# Patient Record
Sex: FEMALE | Race: OTHER | HISPANIC OR LATINO | Employment: FULL TIME | URBAN - METROPOLITAN AREA
[De-identification: names, ages, dates, MRNs, and addresses within clinical notes are randomized per-mention and may not be internally consistent; named-entity substitution may affect disease eponyms.]

---

## 2019-08-14 ENCOUNTER — TELEPHONE (OUTPATIENT)
Dept: PAIN MEDICINE | Facility: CLINIC | Age: 59
End: 2019-08-14

## 2019-08-14 ENCOUNTER — TELEPHONE (OUTPATIENT)
Dept: PHYSICAL THERAPY | Facility: OTHER | Age: 59
End: 2019-08-14

## 2019-08-14 NOTE — TELEPHONE ENCOUNTER
Message left for Pt to call us back  Our ph # and hours of business provided  Waiting for return call from Pt  This nurse did return Pt's phone call

## 2019-08-14 NOTE — TELEPHONE ENCOUNTER
S/w patient, referred by a co-worker who has the "same problem"  Pt has been seeing a chiropractor for sciatica since Jan 2018, hasn't gotten any better  Agent explained [facility], pt agreed to be transferred

## 2019-10-18 ENCOUNTER — NURSE TRIAGE (OUTPATIENT)
Dept: PHYSICAL THERAPY | Facility: OTHER | Age: 59
End: 2019-10-18

## 2019-10-18 NOTE — TELEPHONE ENCOUNTER
This nurse did review in detail the comp spine program and what we can provide for the pt for their back pain  Pt is refusing to see PM&R, and is requesting to see a physician  This nurse again reviewed the roll of PM&R with Pt, and is still requesting to see a physician  Pt stated, "I just want a doctor to tell me why I have all of this pain in back, and feet, and arms "    Pt is currently doing aqua therapy, acupunture, seeing a Chiropractor, Pain management,and Rheumatologist       This nurse did offer Pt to see our physical therapy and Pt refused  Pt is going to follow up with PCP for further direction  Pt was given our phone # and our hours of business, if she should change her mind  No referral noted

## 2019-11-01 ENCOUNTER — OFFICE VISIT (OUTPATIENT)
Dept: OBGYN CLINIC | Facility: MEDICAL CENTER | Age: 59
End: 2019-11-01
Payer: COMMERCIAL

## 2019-11-01 VITALS
HEIGHT: 64 IN | WEIGHT: 160 LBS | DIASTOLIC BLOOD PRESSURE: 93 MMHG | BODY MASS INDEX: 27.31 KG/M2 | HEART RATE: 93 BPM | RESPIRATION RATE: 18 BRPM | SYSTOLIC BLOOD PRESSURE: 141 MMHG

## 2019-11-01 DIAGNOSIS — M54.50 CHRONIC BILATERAL LOW BACK PAIN WITHOUT SCIATICA: ICD-10-CM

## 2019-11-01 DIAGNOSIS — R20.2 NUMBNESS AND TINGLING OF BOTH FEET: ICD-10-CM

## 2019-11-01 DIAGNOSIS — M50.30 DDD (DEGENERATIVE DISC DISEASE), CERVICAL: ICD-10-CM

## 2019-11-01 DIAGNOSIS — M54.2 CHRONIC NECK PAIN: Primary | ICD-10-CM

## 2019-11-01 DIAGNOSIS — G89.29 CHRONIC NECK PAIN: Primary | ICD-10-CM

## 2019-11-01 DIAGNOSIS — M47.812 CERVICAL SPONDYLOSIS: ICD-10-CM

## 2019-11-01 DIAGNOSIS — G89.29 CHRONIC BILATERAL LOW BACK PAIN WITHOUT SCIATICA: ICD-10-CM

## 2019-11-01 DIAGNOSIS — R20.0 NUMBNESS AND TINGLING OF BOTH FEET: ICD-10-CM

## 2019-11-01 PROCEDURE — 99204 OFFICE O/P NEW MOD 45 MIN: CPT | Performed by: EMERGENCY MEDICINE

## 2019-11-01 RX ORDER — MELOXICAM 15 MG/1
15 TABLET ORAL DAILY
COMMUNITY

## 2019-11-01 NOTE — PROGRESS NOTES
om    Assessment/Plan:    Diagnoses and all orders for this visit:    Chronic neck pain  -     Ambulatory referral to Pain Management; Future    DDD (degenerative disc disease), cervical  -     Ambulatory referral to Pain Management; Future    Cervical spondylosis  -     Ambulatory referral to Pain Management; Future    Chronic bilateral low back pain without sciatica  -     Ambulatory referral to Pain Management; Future    Numbness and tingling of both feet  -     Ambulatory referral to Pain Management; Future    Other orders  -     Misc Natural Products (TART CHERRY ADVANCED PO); Take by mouth  -     meloxicam (MOBIC) 15 mg tablet; Take 15 mg by mouth daily  -     calcium-vitamin D (OSCAL) 250-125 MG-UNIT per tablet; Take 1 tablet by mouth daily  -     conjugated estrogens (PREMARIN) vaginal cream; Insert into the vagina daily    Referred to Pain management forf second opinion  Records scanned into system, reviewed previous results and images of testing including MRI, Xrays and EMG  Patient has already undergone therapy, ESIs and medications  Return if symptoms worsen or fail to improve  Chief Complaint:     Chief Complaint   Patient presents with    Spine - Pain       Subjective:   Patient ID: Roger Hebert is a 61 y o  female  NP presents for second opinion regarding pain starting this summer in July  She has upper back midline pain, constant, as well as lower midline pain and pain over b/l SIJs  B/L n/t feet  Treated at outside facility by pain management, prescribed gabapentin however she experienced right sided body n/t  She has undergone oral steroid pills, an injection of the lower back, and MRI was obtained  She was then seen by another pain management physician, underwent ESIs, EMG was ordered due to n/t legs, referred to Rheumatologist whom she saw today  Was evaluated by foot and ankle specialist, dx with tarsal tunnel and provided b/l steroid injection with no benefit      She  Has been working with chiropractor, accupunture and water therapy      Review of Systems    The following portions of the patient's chart were reviewed and updated as appropriate: Allergy:    Allergies   Allergen Reactions    Gabapentin        History reviewed  No pertinent past medical history  Past Surgical History:   Procedure Laterality Date    CARPAL TUNNEL RELEASE      HYSTERECTOMY      PLANTAR FASCIECTOMY         Social History     Socioeconomic History    Marital status: Single     Spouse name: Not on file    Number of children: Not on file    Years of education: Not on file    Highest education level: Not on file   Occupational History    Not on file   Social Needs    Financial resource strain: Not on file    Food insecurity:     Worry: Not on file     Inability: Not on file    Transportation needs:     Medical: Not on file     Non-medical: Not on file   Tobacco Use    Smoking status: Never Smoker    Smokeless tobacco: Never Used   Substance and Sexual Activity    Alcohol use: Not Currently    Drug use: Not on file    Sexual activity: Not on file   Lifestyle    Physical activity:     Days per week: Not on file     Minutes per session: Not on file    Stress: Not on file   Relationships    Social connections:     Talks on phone: Not on file     Gets together: Not on file     Attends [employer] service: Not on file     Active member of club or organization: Not on file     Attends meetings of clubs or organizations: Not on file     Relationship status: Not on file    Intimate partner violence:     Fear of current or ex partner: Not on file     Emotionally abused: Not on file     Physically abused: Not on file     Forced sexual activity: Not on file   Other Topics Concern    Not on file   Social History Narrative    Not on file       History reviewed  No pertinent family history      Medications:    Current Outpatient Medications:     calcium-vitamin D (OSCAL) 250-125 MG-UNIT per tablet, Take 1 tablet by mouth daily, Disp: , Rfl:     conjugated estrogens (PREMARIN) vaginal cream, Insert into the vagina daily, Disp: , Rfl:     meloxicam (MOBIC) 15 mg tablet, Take 15 mg by mouth daily, Disp: , Rfl:     Misc Natural Products (TART CHERRY ADVANCED PO), Take by mouth, Disp: , Rfl:     There is no problem list on file for this patient  Objective:  /93 (BP Location: Left arm, Patient Position: Standing, Cuff Size: Standard)   Pulse 93   Resp 18   Ht 5' 4" (1 626 m)   Wt 72 6 kg (160 lb)   BMI 27 46 kg/m²     Ortho Exam    Physical Exam   Constitutional: She is oriented to person, place, and time  She appears well-developed and well-nourished  HENT:   Head: Normocephalic and atraumatic  Eyes: Conjunctivae are normal    Neck: Normal range of motion  Neck supple  Cardiovascular: Normal rate and intact distal pulses  Pulmonary/Chest: Effort normal  No respiratory distress  Neurological: She is alert and oriented to person, place, and time  Skin: Skin is warm and dry  Psychiatric: She has a normal mood and affect  Her behavior is normal    Vitals reviewed  Neurologic Exam     Mental Status   Oriented to person, place, and time  Procedures    I have personally reviewed the written report of the pertinent studies  Outside EMG performed at [facility]: Results scanned into chart, Mild tarsal tunnel b/l  Interface, Rad Results In - 09/05/2019  3:22 PM EDT  History: Lumbar pain with radiation down left leg    Technique: MRI of the lumbar spine was performed without intravenous contrast  utilizing sagittal T1, sagittal T2, sagittal STIR, and axial T2 sequences  Comparison: None    Findings: There is normal lumbar lordosis  Vertebral body heights and alignment are  maintained  Normal marrow signal is preserved  The distal thoracic cord and  conus medullaris are unremarkable; the conus terminates at the L2 level  Paraspinal soft tissues are unremarkable      Disc desiccation without significant height loss from L3-L4 to L5-S1  L1-L2: No canal or foraminal stenosis  L2-L3: No canal or foraminal stenosis  L3-L4: Trace disc bulge  Mild facet arthropathy  No canal or left foraminal  stenosis  Mild right foraminal stenosis  L4-L5: Mild facet arthropathy  No canal or foraminal stenosis  L5-S1: Disc bulge with small central annular fissure  Mild facet arthropathy  No  canal or foraminal stenosis  T2 hyperintense left renal lesion statistically likely reflects an incidental  cyst but is incompletely characterized on this study  IMPRESSION:   Mild lower lumbar degenerative changes without significant stenosis

## 2020-07-19 ENCOUNTER — OFFICE VISIT (OUTPATIENT)
Dept: URGENT CARE | Age: 60
End: 2020-07-19
Payer: COMMERCIAL

## 2020-07-19 VITALS
RESPIRATION RATE: 16 BRPM | HEIGHT: 64 IN | OXYGEN SATURATION: 99 % | DIASTOLIC BLOOD PRESSURE: 82 MMHG | WEIGHT: 160 LBS | TEMPERATURE: 99 F | HEART RATE: 79 BPM | BODY MASS INDEX: 27.31 KG/M2 | SYSTOLIC BLOOD PRESSURE: 132 MMHG

## 2020-07-19 DIAGNOSIS — N30.00 ACUTE CYSTITIS WITHOUT HEMATURIA: Primary | ICD-10-CM

## 2020-07-19 LAB
SL AMB  POCT GLUCOSE, UA: NEGATIVE
SL AMB LEUKOCYTE ESTERASE,UA: ABNORMAL
SL AMB POCT BILIRUBIN,UA: NEGATIVE
SL AMB POCT BLOOD,UA: ABNORMAL
SL AMB POCT CLARITY,UA: CLEAR
SL AMB POCT COLOR,UA: YELLOW
SL AMB POCT KETONES,UA: NEGATIVE
SL AMB POCT NITRITE,UA: NEGATIVE
SL AMB POCT PH,UA: 6
SL AMB POCT SPECIFIC GRAVITY,UA: 1.01
SL AMB POCT URINE PROTEIN: NEGATIVE
SL AMB POCT UROBILINOGEN: 0.2

## 2020-07-19 PROCEDURE — 99213 OFFICE O/P EST LOW 20 MIN: CPT | Performed by: PHYSICIAN ASSISTANT

## 2020-07-19 PROCEDURE — 87086 URINE CULTURE/COLONY COUNT: CPT | Performed by: PHYSICIAN ASSISTANT

## 2020-07-19 PROCEDURE — 81002 URINALYSIS NONAUTO W/O SCOPE: CPT | Performed by: PHYSICIAN ASSISTANT

## 2020-07-19 RX ORDER — CEPHALEXIN 500 MG/1
500 CAPSULE ORAL EVERY 8 HOURS SCHEDULED
Qty: 21 CAPSULE | Refills: 0 | Status: SHIPPED | COMMUNITY
Start: 2020-07-19 | End: 2020-07-26

## 2020-07-19 RX ORDER — ASPIRIN 81 MG/1
81 TABLET, CHEWABLE ORAL DAILY
COMMUNITY

## 2020-07-19 NOTE — PROGRESS NOTES
[address] [facility]        NAME: Jamie Dial is a 61 y o  female  : 1960    MRN: [id number]  DATE: 2020  TIME: 8:50 AM    Assessment and Plan   Acute cystitis without hematuria [N30 00]  1  Acute cystitis without hematuria  POCT urine dip    Urine culture    cephalexin (KEFLEX) 500 mg capsule         Patient Instructions       Continue to monitor symptoms  If new or worsening symptoms develop, go immediately to Er  Drink plenty of fluids  Follow up with Family Doctor this week  Chief Complaint     Chief Complaint   Patient presents with    Possible UTI     Patient reports UTI sx (dysuria, frequency, flank pain) for approx 72 hours  History of Present Illness       Difficulty Urinating    This is a new problem  Episode onset: 3 days ago  The problem occurs every urination  The problem has been gradually worsening  The quality of the pain is described as burning  The pain is mild  There has been no fever  There is a history of pyelonephritis  Associated symptoms include flank pain, frequency and urgency  Pertinent negatives include no chills, hesitancy, nausea, possible pregnancy or vomiting  She has tried increased fluids for the symptoms  The treatment provided no relief  Review of Systems   Review of Systems   Constitutional: Negative  Negative for chills, fatigue and fever  HENT: Negative  Eyes: Negative  Respiratory: Negative  Cardiovascular: Negative  Gastrointestinal: Negative for abdominal pain, constipation, diarrhea, nausea and vomiting  Endocrine: Negative  Genitourinary: Positive for dysuria, flank pain, frequency and urgency  Negative for hesitancy, pelvic pain, vaginal bleeding, vaginal discharge and vaginal pain  Musculoskeletal: Negative for back pain, gait problem, neck pain and neck stiffness  Skin: Negative  Negative for pallor and rash  Allergic/Immunologic: Negative  Neurological: Negative  Negative for weakness and numbness  Hematological: Negative  Psychiatric/Behavioral: Negative  Current Medications       Current Outpatient Medications:     calcium-vitamin D (OSCAL) 250-125 MG-UNIT per tablet, Take 1 tablet by mouth daily, Disp: , Rfl:     conjugated estrogens (PREMARIN) vaginal cream, Insert into the vagina daily, Disp: , Rfl:     Misc Natural Products (TART CHERRY ADVANCED PO), Take by mouth, Disp: , Rfl:     aspirin (Aspirin 81) 81 mg chewable tablet, Chew 81 mg daily, Disp: , Rfl:     cephalexin (KEFLEX) 500 mg capsule, Take 1 capsule (500 mg total) by mouth every 8 (eight) hours for 7 days, Disp: 21 capsule, Rfl: 0    meloxicam (MOBIC) 15 mg tablet, Take 15 mg by mouth daily, Disp: , Rfl:     Current Allergies     Allergies as of 07/19/2020 - Reviewed 07/19/2020   Allergen Reaction Noted    Gabapentin  11/01/2019            The following portions of the patient's history were reviewed and updated as appropriate: allergies, current medications, past family history, past medical history, past social history, past surgical history and problem list      Past Medical History:   Diagnosis Date    Back pain        Past Surgical History:   Procedure Laterality Date    CARPAL TUNNEL RELEASE      HYSTERECTOMY      PLANTAR FASCIECTOMY         History reviewed  No pertinent family history  Medications have been verified  Objective   /82   Pulse 79   Temp 99 °F (37 2 °C) (Tympanic)   Resp 16   Ht 5' 4" (1 626 m)   Wt 72 6 kg (160 lb)   SpO2 99%   BMI 27 46 kg/m²        Physical Exam     Physical Exam   Constitutional: She appears well-developed and well-nourished  No distress  HENT:   Head: Normocephalic and atraumatic  Cardiovascular: Normal rate, regular rhythm, normal heart sounds and intact distal pulses  Pulmonary/Chest: Effort normal and breath sounds normal  No respiratory distress  She has no wheezes  She has no rales  Abdominal: Soft   Bowel sounds are normal  She exhibits no distension  There is no tenderness  There is no guarding  No CVA tenderness   Skin: Skin is warm  Capillary refill takes less than 2 seconds  No rash noted  She is not diaphoretic  No pallor  Nursing note and vitals reviewed

## 2020-07-19 NOTE — PATIENT INSTRUCTIONS
Continue to monitor symptoms  If new or worsening symptoms develop, go immediately to Er  Drink plenty of fluids  Follow up with Family Doctor this week  Urinary Tract Infection in Women   WHAT YOU NEED TO KNOW:   A urinary tract infection (UTI) is caused by bacteria that get inside your urinary tract  Most bacteria that enter your urinary tract come out when you urinate  If the bacteria stay in your urinary tract, you may get an infection  Your urinary tract includes your kidneys, ureters, bladder, and urethra  Urine is made in your kidneys, and it flows from the ureters to the bladder  Urine leaves the bladder through the urethra  A UTI is more common in your lower urinary tract, which includes your bladder and urethra  DISCHARGE INSTRUCTIONS:   Return to the emergency department if:   · You are urinating very little or not at all  · You have a high fever with shaking chills  · You have side or back pain that gets worse  Contact your healthcare provider if:   · You have a fever  · You do not feel better after 2 days of taking antibiotics  · You are vomiting  · You have questions or concerns about your condition or care  Medicines:   · Antibiotics  help fight a bacterial infection  · Medicines  may be given to decrease pain and burning when you urinate  They will also help decrease the feeling that you need to urinate often  These medicines will make your urine orange or red  · Take your medicine as directed  Contact your healthcare provider if you think your medicine is not helping or if you have side effects  Tell him or her if you are allergic to any medicine  Keep a list of the medicines, vitamins, and herbs you take  Include the amounts, and when and why you take them  Bring the list or the pill bottles to follow-up visits  Carry your medicine list with you in case of an emergency    Follow up with your healthcare provider as directed:  Write down your questions so you remember to ask them during your visits  Prevent another UTI:   · Empty your bladder often  Urinate and empty your bladder as soon as you feel the need  Do not hold your urine for long periods of time  · Wipe from front to back after you urinate or have a bowel movement  This will help prevent germs from getting into your urinary tract through your urethra  · Drink liquids as directed  Ask how much liquid to drink each day and which liquids are best for you  You may need to drink more liquids than usual to help flush out the bacteria  Do not drink alcohol, caffeine, or citrus juices  These can irritate your bladder and increase your symptoms  Your healthcare provider may recommend cranberry juice to help prevent a UTI  · Urinate after you have sex  This can help flush out bacteria passed during sex  · Do not douche or use feminine deodorants  These can change the chemical balance in your vagina  · Change sanitary pads or tampons often  This will help prevent germs from getting into your urinary tract  · Do pelvic muscle exercises often  Pelvic muscle exercises may help you start and stop urinating  Strong pelvic muscles may help you empty your bladder easier  Squeeze these muscles tightly for 5 seconds like you are trying to hold back urine  Then relax for 5 seconds  Gradually work up to squeezing for 10 seconds  Do 3 sets of 15 repetitions a day, or as directed  © 2017 [employer] Information is for End User's use only and may not be sold, redistributed or otherwise used for commercial purposes  All illustrations and images included in CareNotes® are the copyrighted property of A D [employer] M [employer]  or Lance Iverson  The above information is an  only  It is not intended as medical advice for individual conditions or treatments   Talk to your doctor, nurse or pharmacist before following any medical regimen to see if it is safe and effective for you     Cephalexin (By mouth)   Cephalexin ([id number])  Treats infections  This medicine is a cephalosporin antibiotic  Brand Name(s): Daxbia, Keflex   There may be other brand names for this medicine  When This Medicine Should Not Be Used: This medicine is not right for everyone  Do not use this medicine if you had an allergic reaction to cephalexin or another cephalosporin medicine  How to Use This Medicine:   Capsule, Tablet, Tablet for Suspension, Liquid  · Your doctor will tell you how much medicine to use  Do not use more than directed  · Read and follow the patient instructions that come with this medicine  Talk to your doctor or pharmacist if you have any questions  · You may take your medicine with food or milk to avoid stomach upset  · Oral liquid: Shake well just before use  Measure the oral liquid medicine with a marked measuring spoon, oral syringe, or medicine cup  · Take all of the medicine in your prescription to clear up your infection, even if you feel better after the first few doses  · Missed dose: Take a dose as soon as you remember  If it is almost time for your next dose, wait until then and take a regular dose  Do not take extra medicine to make up for a missed dose  · Capsule or tablet: Store at room temperature away from heat, moisture, and direct light  · Oral liquid: Store in the refrigerator for 14 days  After 14 days, throw away any unused medicine  Do not freeze  Drugs and Foods to Avoid:   Ask your doctor or pharmacist before using any other medicine, including over-the-counter medicines, vitamins, and herbal products  · Some medicines and foods can affect how cephalexin works  Tell your doctor if you are also using  ¨ Metformin  ¨ Probenecid  Warnings While Using This Medicine:   · Tell your doctor if you are pregnant or breastfeeding, or if you have kidney disease, liver disease, or a history of digestive problems, such as colitis   Tell your doctor if you are allergic to penicillin  · This medicine can cause diarrhea  Call your doctor if the diarrhea becomes severe, does not stop, or is bloody  Do not take any medicine to stop diarrhea until you have talked to your doctor  Diarrhea can occur 2 months or more after you stop taking this medicine  · Tell any doctor or dentist who treats you that you are using this medicine  This medicine may affect certain medical test results  · Call your doctor if your symptoms do not improve or if they get worse  · Keep all medicine out of the reach of children  Never share your medicine with anyone  Possible Side Effects While Using This Medicine:   Call your doctor right away if you notice any of these side effects:  · Allergic reaction: Itching or hives, swelling in your face or hands, swelling or tingling in your mouth or throat, chest tightness, trouble breathing  · Blistering, peeling, red skin rash  · Severe diarrhea, especially if bloody or ongoing  · Severe stomach pain, vomiting  If you notice these less serious side effects, talk with your doctor:   · Mild diarrhea or nausea  If you notice other side effects that you think are caused by this medicine, tell your doctor  Call your doctor for medical advice about side effects  You may report side effects to FDA at 8-776-FDA-0378  © 2017 [employer] Information is for End User's use only and may not be sold, redistributed or otherwise used for commercial purposes  The above information is an  only  It is not intended as medical advice for individual conditions or treatments  Talk to your doctor, nurse or pharmacist before following any medical regimen to see if it is safe and effective for you

## 2020-07-20 LAB — BACTERIA UR CULT: ABNORMAL

## 2022-11-01 ENCOUNTER — OFFICE VISIT (OUTPATIENT)
Dept: FAMILY MEDICINE CLINIC | Facility: CLINIC | Age: 62
End: 2022-11-01

## 2022-11-01 VITALS
DIASTOLIC BLOOD PRESSURE: 80 MMHG | HEIGHT: 62 IN | BODY MASS INDEX: 26.06 KG/M2 | HEART RATE: 68 BPM | WEIGHT: 141.6 LBS | OXYGEN SATURATION: 99 % | SYSTOLIC BLOOD PRESSURE: 132 MMHG | RESPIRATION RATE: 16 BRPM | TEMPERATURE: 97.9 F

## 2022-11-01 DIAGNOSIS — G89.29 CHRONIC LEFT-SIDED LOW BACK PAIN WITH LEFT-SIDED SCIATICA: Primary | ICD-10-CM

## 2022-11-01 DIAGNOSIS — Z00.00 HEALTHCARE MAINTENANCE: ICD-10-CM

## 2022-11-01 DIAGNOSIS — E78.00 HIGH CHOLESTEROL: ICD-10-CM

## 2022-11-01 DIAGNOSIS — N95.2 VAGINAL ATROPHY: ICD-10-CM

## 2022-11-01 DIAGNOSIS — Z87.42 HISTORY OF UTERINE PROLAPSE: ICD-10-CM

## 2022-11-01 DIAGNOSIS — M54.42 CHRONIC LEFT-SIDED LOW BACK PAIN WITH LEFT-SIDED SCIATICA: Primary | ICD-10-CM

## 2022-11-01 DIAGNOSIS — R73.01 IFG (IMPAIRED FASTING GLUCOSE): ICD-10-CM

## 2022-11-01 DIAGNOSIS — Z78.0 ASYMPTOMATIC MENOPAUSE: ICD-10-CM

## 2022-11-01 PROBLEM — M54.50 CHRONIC LOW BACK PAIN: Status: RESOLVED | Noted: 2022-11-01 | Resolved: 2022-11-01

## 2022-11-01 PROBLEM — M54.50 CHRONIC LOW BACK PAIN: Status: ACTIVE | Noted: 2022-11-01

## 2022-11-01 PROBLEM — F41.9 ANXIETY: Status: ACTIVE | Noted: 2022-07-30

## 2022-11-01 RX ORDER — MELOXICAM 7.5 MG/1
TABLET ORAL
COMMUNITY
Start: 2022-11-01

## 2022-11-01 RX ORDER — CYCLOBENZAPRINE HCL 5 MG
TABLET ORAL
COMMUNITY
Start: 2022-11-01

## 2022-11-01 NOTE — PROGRESS NOTES
Name: Jian Huffman      : 1960      MRN: [id number]  Encounter Provider: Xavier Mendoza MD  Encounter Date: 2022   Encounter department: [facility] [address] PRIMARY CARE    Assessment & Plan     1  Chronic left-sided low back pain with left-sided sciatica  Assessment & Plan:  Not very well controlled  Continue to follow up with back specialist       2  Healthcare maintenance  Assessment & Plan: It was discussed about immunizations, diet, exercise and safety measures  3  High cholesterol  Assessment & Plan:  Well controlled  Last labs 2021  Repeat fasting lipid panel   Follow up in 1 month    Orders:  -     CBC and differential; Future  -     Comprehensive metabolic panel; Future  -     Lipid Panel with Direct LDL reflex; Future  -     TSH, 3rd generation with Free T4 reflex; Future    4  IFG (impaired fasting glucose)  Assessment & Plan:  Controlled  Last labs 2022 WNL  Repeat labs and follow up in 1 month    Orders:  -     Hemoglobin A1C; Future    5  Asymptomatic menopause  -     DXA bone density spine hip and pelvis; Future; Expected date: 2022    6  Vaginal atrophy  Assessment & Plan:  Controlled with Premarin cream  Pt given referral to GYN for further care    Orders:  -     Ambulatory Referral to Gynecology; Future    7  History of uterine prolapse  -     Ambulatory Referral to Gynecology; Future    8  BMI 25 0-25 9,adult        Depression Screening and Follow-up Plan: Patient was screened for depression during today's encounter  They screened negative with a PHQ-2 score of 0  Subjective     HPI   Daniel Zee is a 59 yo female presenting to the office to establish care  Pt has no complaints today and reports to taking medications as prescribed without side effects   She has chronic low back pain with sciatic down L leg for which she was told to take calcium and Vitamin D for by a previous provider and vaginal atrophy which she uses Premarin cream     Pt has mammogram scheduled for 2022  She is unsure if she ever had a DEXA scan  Her family history is significant for colon cancer in mother and cervical cancer in sister  Her brother also  from heart attack in his early 62s  Patient reports "bone problems" in family  Patient has not had labwork done since 2021  [facility]    The following portions of the patient's history were reviewed and updated as appropriate: allergies, current medications, past family history, past medical history, past social history, past surgical history and problem list   Patient's recent labwork and imaging were reviewed  Review of Systems   Constitutional: Negative for chills, fatigue and fever  HENT: Negative for congestion and sore throat  Respiratory: Negative for cough and shortness of breath  Cardiovascular: Negative for chest pain, palpitations and leg swelling  Gastrointestinal: Negative for abdominal pain, constipation and diarrhea  Genitourinary: Negative for dysuria and frequency  Musculoskeletal: Positive for back pain  Neurological: Negative for dizziness and headaches  Psychiatric/Behavioral: Negative  The patient is not nervous/anxious  Past Medical History:   Diagnosis Date   • Back pain      Past Surgical History:   Procedure Laterality Date   • CARPAL TUNNEL RELEASE     • HYSTERECTOMY     • PLANTAR FASCIECTOMY       Family History   Problem Relation Age of Onset   • Colon cancer Mother    • Cervical cancer Sister    • Heart attack Brother      Social History     Socioeconomic History   • Marital status: Single     Spouse name: None   • Number of children: None   • Years of education: None   • Highest education level: None   Occupational History   • None   Tobacco Use   • Smoking status: Never Smoker   • Smokeless tobacco: Never Used   Vaping Use   • Vaping Use: Never used   Substance and Sexual Activity   • Alcohol use:  Yes     Alcohol/week: 1 0 - 2 0 standard drink     Types: 1 - 2 Glasses of wine per week   • Drug use: None   • Sexual activity: None   Other Topics Concern   • None   Social History Narrative   • None     Social Determinants of Health     Financial Resource Strain: Not on file   Food Insecurity: Not on file   Transportation Needs: Not on file   Physical Activity: Not on file   Stress: Not on file   Social Connections: Not on file   Intimate Partner Violence: Not on file   Housing Stability: Not on file     Current Outpatient Medications on File Prior to Visit   Medication Sig   • aspirin 81 mg chewable tablet Chew 81 mg daily   • conjugated estrogens (PREMARIN) vaginal cream Insert into the vagina daily   • cyclobenzaprine (FLEXERIL) 5 mg tablet    • meloxicam (MOBIC) 7 5 mg tablet    • Multiple Vitamins-Minerals (ONE-A-DAY WOMENS 50 PLUS PO) Take by mouth daily   • calcium carbonate (OS-PALAK) 600 MG tablet Take 600 mg by mouth 2 (two) times a day with meals   • calcium-vitamin D (OSCAL) 250-125 MG-UNIT per tablet Take 1 tablet by mouth daily   • Na Sulfate-K Sulfate-Mg Sulf 17 5-3 13-1 6 GM/177ML SOLN Take 1 kit by mouth see administration instructions (Patient not taking: Reported on 11/1/2022)   • nortriptyline (PAMELOR) 10 mg capsule Take 10 mg by mouth   • Potassium (POTASSIMIN PO) Take by mouth daily (Patient not taking: Reported on 11/1/2022)   • traMADol (ULTRAM) 50 mg tablet TAKE ONE TABLET BY MOUTH EVERY 6 HOURS AS NEEDED FOR SEVERE PAIN ( PAIN SCORE 7-10 ) (Patient not taking: Reported on 11/1/2022)   • TURMERIC PO Take by mouth daily (Patient not taking: Reported on 11/1/2022)   • [DISCONTINUED] meloxicam (MOBIC) 15 mg tablet Take 15 mg by mouth daily (Patient not taking: Reported on 11/1/2022)   • [DISCONTINUED] Misc Natural Products (TART CHERRY ADVANCED PO) Take by mouth (Patient not taking: Reported on 11/1/2022)     Allergies   Allergen Reactions   • Gabapentin Anaphylaxis     Immunization History   Administered Date(s) Administered   • INFLUENZA 09/26/2013, 09/27/2019   • Influenza Quadrivalent Preservative Free 3 years and older IM 08/25/2020   • Tdap 10/31/2019   • influenza, injectable, quadrivalent 08/24/2020       Objective     /80 (BP Location: Left arm, Patient Position: Sitting, Cuff Size: Standard)   Pulse 68   Temp 97 9 °F (36 6 °C) (Tympanic)   Resp 16   Ht 5' 2" (1 575 m)   Wt 64 2 kg (141 lb 9 6 oz)   SpO2 99%   BMI 25 90 kg/m²     Physical Exam  Vitals and nursing note reviewed  Constitutional:       Appearance: Normal appearance  HENT:      Head: Normocephalic and atraumatic  Right Ear: Tympanic membrane, ear canal and external ear normal       Left Ear: Tympanic membrane, ear canal and external ear normal       Mouth/Throat:      Mouth: Mucous membranes are moist       Pharynx: Oropharynx is clear  Eyes:      Conjunctiva/sclera: Conjunctivae normal       Pupils: Pupils are equal, round, and reactive to light  Cardiovascular:      Rate and Rhythm: Normal rate and regular rhythm  Heart sounds: Normal heart sounds  Pulmonary:      Effort: Pulmonary effort is normal       Breath sounds: Normal breath sounds  Musculoskeletal:      Right lower leg: No edema  Left lower leg: No edema  Skin:     General: Skin is warm and dry  Neurological:      General: No focal deficit present  Mental Status: She is alert and oriented to person, place, and time  Psychiatric:         Mood and Affect: Mood normal          Behavior: Behavior normal        Trish Prieto MD BMI Counseling: Body mass index is 25 9 kg/m²  The BMI is above normal  Nutrition recommendations include reducing portion sizes, decreasing overall calorie intake and 3-5 servings of fruits/vegetables daily  Exercise recommendations include moderate aerobic physical activity for 150 minutes/week

## 2022-11-17 ENCOUNTER — APPOINTMENT (OUTPATIENT)
Dept: LAB | Age: 62
End: 2022-11-17

## 2022-11-17 DIAGNOSIS — E78.00 HIGH CHOLESTEROL: ICD-10-CM

## 2022-11-17 DIAGNOSIS — R73.01 IFG (IMPAIRED FASTING GLUCOSE): ICD-10-CM

## 2022-11-17 LAB
ALBUMIN SERPL BCP-MCNC: 3.6 G/DL (ref 3.5–5)
ALP SERPL-CCNC: 41 U/L (ref 46–116)
ALT SERPL W P-5'-P-CCNC: 18 U/L (ref 12–78)
ANION GAP SERPL CALCULATED.3IONS-SCNC: 2 MMOL/L (ref 4–13)
AST SERPL W P-5'-P-CCNC: 21 U/L (ref 5–45)
BASOPHILS # BLD AUTO: 0.02 THOUSANDS/ÂΜL (ref 0–0.1)
BASOPHILS NFR BLD AUTO: 0 % (ref 0–1)
BILIRUB SERPL-MCNC: 0.34 MG/DL (ref 0.2–1)
BUN SERPL-MCNC: 17 MG/DL (ref 5–25)
CALCIUM SERPL-MCNC: 9 MG/DL (ref 8.3–10.1)
CHLORIDE SERPL-SCNC: 109 MMOL/L (ref 96–108)
CHOLEST SERPL-MCNC: 176 MG/DL
CO2 SERPL-SCNC: 29 MMOL/L (ref 21–32)
CREAT SERPL-MCNC: 0.43 MG/DL (ref 0.6–1.3)
EOSINOPHIL # BLD AUTO: 0.15 THOUSAND/ÂΜL (ref 0–0.61)
EOSINOPHIL NFR BLD AUTO: 3 % (ref 0–6)
ERYTHROCYTE [DISTWIDTH] IN BLOOD BY AUTOMATED COUNT: 12.6 % (ref 11.6–15.1)
EST. AVERAGE GLUCOSE BLD GHB EST-MCNC: 111 MG/DL
GFR SERPL CREATININE-BSD FRML MDRD: 109 ML/MIN/1.73SQ M
GLUCOSE P FAST SERPL-MCNC: 98 MG/DL (ref 65–99)
HBA1C MFR BLD: 5.5 %
HCT VFR BLD AUTO: 41.4 % (ref 34.8–46.1)
HDLC SERPL-MCNC: 60 MG/DL
HGB BLD-MCNC: 13.3 G/DL (ref 11.5–15.4)
IMM GRANULOCYTES # BLD AUTO: 0.01 THOUSAND/UL (ref 0–0.2)
IMM GRANULOCYTES NFR BLD AUTO: 0 % (ref 0–2)
LDLC SERPL CALC-MCNC: 102 MG/DL (ref 0–100)
LYMPHOCYTES # BLD AUTO: 1.58 THOUSANDS/ÂΜL (ref 0.6–4.47)
LYMPHOCYTES NFR BLD AUTO: 30 % (ref 14–44)
MCH RBC QN AUTO: 31.3 PG (ref 26.8–34.3)
MCHC RBC AUTO-ENTMCNC: 32.1 G/DL (ref 31.4–37.4)
MCV RBC AUTO: 97 FL (ref 82–98)
MONOCYTES # BLD AUTO: 0.51 THOUSAND/ÂΜL (ref 0.17–1.22)
MONOCYTES NFR BLD AUTO: 10 % (ref 4–12)
NEUTROPHILS # BLD AUTO: 3.09 THOUSANDS/ÂΜL (ref 1.85–7.62)
NEUTS SEG NFR BLD AUTO: 57 % (ref 43–75)
NRBC BLD AUTO-RTO: 0 /100 WBCS
PLATELET # BLD AUTO: 337 THOUSANDS/UL (ref 149–390)
PMV BLD AUTO: 9.6 FL (ref 8.9–12.7)
POTASSIUM SERPL-SCNC: 4.4 MMOL/L (ref 3.5–5.3)
PROT SERPL-MCNC: 6.6 G/DL (ref 6.4–8.4)
RBC # BLD AUTO: 4.25 MILLION/UL (ref 3.81–5.12)
SODIUM SERPL-SCNC: 140 MMOL/L (ref 135–147)
TRIGL SERPL-MCNC: 68 MG/DL
TSH SERPL DL<=0.05 MIU/L-ACNC: 0.73 UIU/ML (ref 0.45–4.5)
WBC # BLD AUTO: 5.36 THOUSAND/UL (ref 4.31–10.16)

## 2022-12-08 ENCOUNTER — HOSPITAL ENCOUNTER (OUTPATIENT)
Dept: RADIOLOGY | Facility: IMAGING CENTER | Age: 62
End: 2022-12-08

## 2022-12-08 DIAGNOSIS — Z78.0 ASYMPTOMATIC MENOPAUSE: ICD-10-CM

## 2022-12-19 ENCOUNTER — TELEPHONE (OUTPATIENT)
Dept: FAMILY MEDICINE CLINIC | Facility: CLINIC | Age: 62
End: 2022-12-19

## 2022-12-19 NOTE — TELEPHONE ENCOUNTER
----- Message from Isamar Crawford MD sent at 12/17/2022 11:22 AM EST -----   Based on lumbar spine her DEXA scan showed osteoporosis  I recommend Prolia  Patient has an appointment on December 27th I would discuss further

## 2022-12-19 NOTE — TELEPHONE ENCOUNTER
Lm for pt to view results and provider message in her my chart   She can call office or results will be discussed with her time of visit on 12/27/22

## 2022-12-27 ENCOUNTER — OFFICE VISIT (OUTPATIENT)
Dept: FAMILY MEDICINE CLINIC | Facility: CLINIC | Age: 62
End: 2022-12-27

## 2022-12-27 VITALS
BODY MASS INDEX: 26.09 KG/M2 | TEMPERATURE: 97.9 F | RESPIRATION RATE: 16 BRPM | HEIGHT: 62 IN | SYSTOLIC BLOOD PRESSURE: 132 MMHG | DIASTOLIC BLOOD PRESSURE: 80 MMHG | WEIGHT: 141.8 LBS | HEART RATE: 79 BPM | OXYGEN SATURATION: 99 %

## 2022-12-27 DIAGNOSIS — M54.42 CHRONIC LEFT-SIDED LOW BACK PAIN WITH LEFT-SIDED SCIATICA: Primary | ICD-10-CM

## 2022-12-27 DIAGNOSIS — M81.6 LOCALIZED OSTEOPOROSIS WITHOUT CURRENT PATHOLOGICAL FRACTURE: ICD-10-CM

## 2022-12-27 DIAGNOSIS — R35.0 URINE FREQUENCY: ICD-10-CM

## 2022-12-27 DIAGNOSIS — N95.2 VAGINAL ATROPHY: ICD-10-CM

## 2022-12-27 DIAGNOSIS — E78.00 HIGH CHOLESTEROL: ICD-10-CM

## 2022-12-27 DIAGNOSIS — G89.29 CHRONIC LEFT-SIDED LOW BACK PAIN WITH LEFT-SIDED SCIATICA: Primary | ICD-10-CM

## 2022-12-27 LAB
BACTERIA UR QL AUTO: ABNORMAL /HPF
BILIRUB UR QL STRIP: NEGATIVE
CLARITY UR: CLEAR
COLOR UR: ABNORMAL
GLUCOSE UR STRIP-MCNC: NEGATIVE MG/DL
HGB UR QL STRIP.AUTO: NEGATIVE
KETONES UR STRIP-MCNC: NEGATIVE MG/DL
LEUKOCYTE ESTERASE UR QL STRIP: ABNORMAL
NITRITE UR QL STRIP: NEGATIVE
NON-SQ EPI CELLS URNS QL MICRO: ABNORMAL /HPF
PH UR STRIP.AUTO: 6.5 [PH]
PROT UR STRIP-MCNC: NEGATIVE MG/DL
RBC #/AREA URNS AUTO: ABNORMAL /HPF
RENAL EPI CELLS #/AREA URNS HPF: PRESENT /[HPF]
SP GR UR STRIP.AUTO: 1.02 (ref 1–1.03)
TRANS CELLS #/AREA URNS HPF: PRESENT /[HPF]
UROBILINOGEN UR STRIP-ACNC: <2 MG/DL
WBC #/AREA URNS AUTO: ABNORMAL /HPF

## 2022-12-27 RX ORDER — CONJUGATED ESTROGENS 0.62 MG/G
1 CREAM VAGINAL DAILY
Qty: 30 G | Refills: 2 | Status: SHIPPED | OUTPATIENT
Start: 2022-12-27

## 2022-12-27 RX ORDER — OMEGA-3-ACID ETHYL ESTERS 1 G/1
2 CAPSULE, LIQUID FILLED ORAL 2 TIMES DAILY
Qty: 120 CAPSULE | Refills: 3 | Status: SHIPPED | OUTPATIENT
Start: 2022-12-27

## 2022-12-27 NOTE — ASSESSMENT & PLAN NOTE
Newly diagnosed osteoporosis  Discussed with patient about the treatment options but she refuses to take any medications  She wants to try with exercise and diet first   We will continue to monitor

## 2022-12-27 NOTE — ASSESSMENT & PLAN NOTE
Well-controlled  Continue on Lovaza  Continue to follow low-fat diet and regular exercise  We will continue to monitor fasting lipid profile    Component      Latest Ref Rng & Units 11/17/2022   Cholesterol      See Comment mg/dL 176   Triglycerides      See Comment mg/dL 68   HDL      >=50 mg/dL 60   LDL Calculated      0 - 100 mg/dL 102 (H)

## 2022-12-27 NOTE — PROGRESS NOTES
Name: Clay Shaw      : 1960      MRN: [id number]  Encounter Provider: Adithya Nielsen MD  Encounter Date: 2022   Encounter department: [address] [place] [address]  Chronic left-sided low back pain with left-sided sciatica  Assessment & Plan:  Continue to follow-up with pain management and I am going to refer her to physical therapy  Orders:  -     Ambulatory Referral to Comprehensive Spine PT; Future    2  High cholesterol  Assessment & Plan:  Well-controlled  Continue on Lovaza  Continue to follow low-fat diet and regular exercise  We will continue to monitor fasting lipid profile  Component      Latest Ref Rng & Units 2022   Cholesterol      See Comment mg/dL 176   Triglycerides      See Comment mg/dL 68   HDL      >=50 mg/dL 60   LDL Calculated      0 - 100 mg/dL 102 (H)       Orders:  -     omega-3-acid ethyl esters (LOVAZA) 1 g capsule; Take 2 capsules (2 g total) by mouth 2 (two) times a day    3  Vaginal atrophy  Assessment & Plan:  She was given a refill for Premarin cream   She was told she needs to see gynecologist     Orders:  -     estrogens, conjugated (Premarin) vaginal cream; Insert 1 g into the vagina daily    4  Urine frequency  Assessment & Plan:  Continue to increase oral  I am going to check UA and C&S       Orders:  -     Urine culture; Future  -     UA w Reflex to Microscopic w Reflex to Culture - Clinic Collect  -     Urine culture    5  Localized osteoporosis without current pathological fracture  Assessment & Plan:  Newly diagnosed osteoporosis  Discussed with patient about the treatment options but she refuses to take any medications  She wants to try with exercise and diet first   We will continue to monitor  Subjective     She is here today for follow-up multiple medical problems  She had her blood work done recently and came back normal   Her DEXA scan showed osteoporosis    She has history of chronic back pain and she has been following up with pain management [facility]  Review of Systems   Constitutional: Negative for chills and fever  HENT: Negative for trouble swallowing  Eyes: Negative for visual disturbance  Respiratory: Negative for cough and shortness of breath  Cardiovascular: Negative for chest pain, palpitations and leg swelling  Gastrointestinal: Negative for abdominal pain, constipation and diarrhea  Endocrine: Negative for cold intolerance and heat intolerance  Genitourinary: Positive for frequency  Negative for difficulty urinating and dysuria  Musculoskeletal: Positive for back pain  Negative for gait problem  Skin: Negative for rash  Neurological: Negative for dizziness, tremors, seizures and headaches  Hematological: Negative for adenopathy  Psychiatric/Behavioral: Negative for behavioral problems  Past Medical History:   Diagnosis Date   • Back pain      Past Surgical History:   Procedure Laterality Date   • CARPAL TUNNEL RELEASE     • HYSTERECTOMY     • PLANTAR FASCIECTOMY       Family History   Problem Relation Age of Onset   • Colon cancer Mother    • Cervical cancer Sister    • Heart attack Brother      Social History     Socioeconomic History   • Marital status: Single     Spouse name: None   • Number of children: None   • Years of education: None   • Highest education level: None   Occupational History   • None   Tobacco Use   • Smoking status: Never   • Smokeless tobacco: Never   Vaping Use   • Vaping Use: Never used   Substance and Sexual Activity   • Alcohol use:  Yes     Alcohol/week: 1 0 - 2 0 standard drink     Types: 1 - 2 Glasses of wine per week   • Drug use: None   • Sexual activity: None   Other Topics Concern   • None   Social History Narrative   • None     Social Determinants of Health     Financial Resource Strain: Not on file   Food Insecurity: Not on file   Transportation Needs: Not on file   Physical Activity: Not on file   Stress: Not on file   Social Connections: Not on file   Intimate Partner Violence: Not on file   Housing Stability: Not on file     Current Outpatient Medications on File Prior to Visit   Medication Sig   • aspirin 81 mg chewable tablet Chew 81 mg daily   • conjugated estrogens (PREMARIN) vaginal cream Insert into the vagina daily   • cyclobenzaprine (FLEXERIL) 5 mg tablet    • meloxicam (MOBIC) 7 5 mg tablet    • Multiple Vitamins-Minerals (ONE-A-DAY WOMENS 50 PLUS PO) Take by mouth daily     Allergies   Allergen Reactions   • Gabapentin Anaphylaxis     Immunization History   Administered Date(s) Administered   • COVID-19 MODERNA VACC 0 5 ML IM 03/15/2021, 04/12/2021, 11/23/2021   • INFLUENZA 09/26/2013, 09/27/2019, 08/25/2020, 11/09/2021, 11/07/2022   • Influenza Quadrivalent Preservative Free 3 years and older IM 08/25/2020   • Tdap 10/31/2019   • influenza, injectable, quadrivalent 08/24/2020       Objective     /80 (BP Location: Left arm, Patient Position: Sitting, Cuff Size: Standard)   Pulse 79   Temp 97 9 °F (36 6 °C) (Tympanic)   Resp 16   Ht 5' 2" (1 575 m)   Wt 64 3 kg (141 lb 12 8 oz)   SpO2 99%   BMI 25 94 kg/m²     Physical Exam  Vitals and nursing note reviewed  Constitutional:       Appearance: She is well-developed  HENT:      Head: Normocephalic and atraumatic  Eyes:      Pupils: Pupils are equal, round, and reactive to light  Cardiovascular:      Rate and Rhythm: Normal rate and regular rhythm  Heart sounds: Normal heart sounds  Pulmonary:      Effort: Pulmonary effort is normal       Breath sounds: Normal breath sounds  Abdominal:      General: Bowel sounds are normal       Palpations: Abdomen is soft  Musculoskeletal:      Cervical back: Normal range of motion and neck supple  Right lower leg: No edema  Left lower leg: No edema  Lymphadenopathy:      Cervical: No cervical adenopathy  Skin:     General: Skin is warm     Neurological:      Mental Status: She is alert and oriented to person, place, and time  Cranial Nerves: No cranial nerve deficit         Beata Bland MD

## 2022-12-28 LAB — BACTERIA UR CULT: NORMAL

## 2022-12-29 ENCOUNTER — TELEPHONE (OUTPATIENT)
Dept: FAMILY MEDICINE CLINIC | Facility: CLINIC | Age: 62
End: 2022-12-29

## 2022-12-29 NOTE — TELEPHONE ENCOUNTER
LM stating urine test came back normal, any questions to call office back  Communication form- okay to ROMAINE

## 2022-12-29 NOTE — TELEPHONE ENCOUNTER
----- Message from Elana Jackson MD sent at 12/29/2022  7:09 AM EST -----    Urine culture did not show UTI

## 2022-12-31 PROBLEM — Z00.00 HEALTHCARE MAINTENANCE: Status: RESOLVED | Noted: 2022-11-01 | Resolved: 2022-12-31

## 2023-06-02 ENCOUNTER — OFFICE VISIT (OUTPATIENT)
Dept: FAMILY MEDICINE CLINIC | Facility: CLINIC | Age: 63
End: 2023-06-02

## 2023-06-02 VITALS
WEIGHT: 145 LBS | HEIGHT: 62 IN | HEART RATE: 71 BPM | RESPIRATION RATE: 16 BRPM | DIASTOLIC BLOOD PRESSURE: 84 MMHG | SYSTOLIC BLOOD PRESSURE: 122 MMHG | TEMPERATURE: 97.4 F | OXYGEN SATURATION: 99 % | BODY MASS INDEX: 26.68 KG/M2

## 2023-06-02 DIAGNOSIS — R29.818 SUSPECTED SLEEP APNEA: Primary | ICD-10-CM

## 2023-06-02 DIAGNOSIS — G89.29 CHRONIC LEFT-SIDED LOW BACK PAIN WITH LEFT-SIDED SCIATICA: ICD-10-CM

## 2023-06-02 DIAGNOSIS — E78.00 HIGH CHOLESTEROL: ICD-10-CM

## 2023-06-02 DIAGNOSIS — M81.6 LOCALIZED OSTEOPOROSIS WITHOUT CURRENT PATHOLOGICAL FRACTURE: ICD-10-CM

## 2023-06-02 DIAGNOSIS — M54.42 CHRONIC LEFT-SIDED LOW BACK PAIN WITH LEFT-SIDED SCIATICA: ICD-10-CM

## 2023-06-02 DIAGNOSIS — E55.9 VITAMIN D INSUFFICIENCY: ICD-10-CM

## 2023-06-02 RX ORDER — LIFITEGRAST 50 MG/ML
SOLUTION/ DROPS OPHTHALMIC
COMMUNITY
Start: 2023-04-06

## 2023-06-02 NOTE — ASSESSMENT & PLAN NOTE
Discussed about treatment options  She refuses medications  She wants to continue on her vitamin D and discussed about core exercises

## 2023-06-02 NOTE — PROGRESS NOTES
Name: Joshua Mcclellan      : 1960      MRN: [id number]  Encounter Provider: Ceasar Chacon MD  Encounter Date: 2023   Encounter department: [address] [place] [address]  Suspected sleep apnea  Assessment & Plan:  Referral to sleep medicine  Orders:  -     Ambulatory Referral to Sleep Medicine; Future    2  High cholesterol  Assessment & Plan:  Not well controlled  Discussed about low-fat diet and regular exercise  I am going to check fasting lipid profile  Orders:  -     CBC and differential; Future  -     Comprehensive metabolic panel; Future  -     Lipid Panel with Direct LDL reflex; Future  -     TSH, 3rd generation with Free T4 reflex; Future    3  Vitamin D insufficiency  -     Vitamin D 25 hydroxy; Future    4  Chronic left-sided low back pain with left-sided sciatica  Assessment & Plan:  Fair control on muscle relaxant and anti-inflammatory  Continue with low back exercises  Continue to monitor  5  Localized osteoporosis without current pathological fracture  Assessment & Plan:  Discussed about treatment options  She refuses medications  She wants to continue on her vitamin D and discussed about core exercises  Subjective     She is here today for follow-up multiple medical problems  She continues having problems with eye irritation due to dryness  She stated that she has problem with sleeping including snoring and sleep with her eyes open  Her ophthalmologist suggested that she is most likely having dryness in her eyes due to her sleeping with open eye  Recommended referral to sleep medicine  Review of Systems   Constitutional: Negative for chills and fever  HENT: Negative for trouble swallowing  Eyes: Positive for itching  Negative for visual disturbance  Respiratory: Negative for cough and shortness of breath  Cardiovascular: Negative for chest pain, palpitations and leg swelling     Gastrointestinal: Negative for abdominal pain, constipation and diarrhea  Endocrine: Negative for cold intolerance and heat intolerance  Genitourinary: Negative for difficulty urinating and dysuria  Musculoskeletal: Positive for back pain  Negative for gait problem  Skin: Negative for rash  Neurological: Negative for dizziness, tremors, seizures and headaches  Hematological: Negative for adenopathy  Psychiatric/Behavioral: Negative for behavioral problems  Past Medical History:   Diagnosis Date   • Back pain      Past Surgical History:   Procedure Laterality Date   • CARPAL TUNNEL RELEASE     • HYSTERECTOMY     • PLANTAR FASCIECTOMY       Family History   Problem Relation Age of Onset   • Colon cancer Mother    • Cervical cancer Sister    • Heart attack Brother      Social History     Socioeconomic History   • Marital status: Single     Spouse name: None   • Number of children: None   • Years of education: None   • Highest education level: None   Occupational History   • None   Tobacco Use   • Smoking status: Never   • Smokeless tobacco: Never   Vaping Use   • Vaping Use: Never used   Substance and Sexual Activity   • Alcohol use:  Yes     Alcohol/week: 1 0 - 2 0 standard drink of alcohol     Types: 1 - 2 Glasses of wine per week   • Drug use: None   • Sexual activity: None   Other Topics Concern   • None   Social History Narrative   • None     Social Determinants of Health     Financial Resource Strain: Not on file   Food Insecurity: Not on file   Transportation Needs: Not on file   Physical Activity: Not on file   Stress: Not on file   Social Connections: Not on file   Intimate Partner Violence: Not on file   Housing Stability: Not on file     Current Outpatient Medications on File Prior to Visit   Medication Sig   • aspirin 81 mg chewable tablet Chew 81 mg daily   • Cholecalciferol 50 MCG (2000 UT) CAPS 1 capsule   • cyclobenzaprine (FLEXERIL) 5 mg tablet    • estrogens, conjugated (Premarin) vaginal cream Insert 1 g into "the vagina daily   • meloxicam (MOBIC) 7 5 mg tablet    • Multiple Vitamins-Minerals (ONE-A-DAY WOMENS 50 PLUS PO) Take by mouth daily   • omega-3-acid ethyl esters (LOVAZA) 1 g capsule Take 2 capsules (2 g total) by mouth 2 (two) times a day   • Xiidra 5 % op solution INSTILL 1 DROP INTO BOTH EYES TWICE A DAY   • [DISCONTINUED] conjugated estrogens (PREMARIN) vaginal cream Insert into the vagina daily (Patient not taking: Reported on 6/2/2023)     Allergies   Allergen Reactions   • Gabapentin Anaphylaxis     Immunization History   Administered Date(s) Administered   • COVID-19 MODERNA VACC 0 5 ML IM 03/15/2021, 04/12/2021, 11/23/2021   • INFLUENZA 09/26/2013, 09/27/2019, 08/25/2020, 11/09/2021, 11/07/2022   • Influenza Quadrivalent Preservative Free 3 years and older IM 08/25/2020   • Tdap 10/31/2019   • influenza, injectable, quadrivalent 08/24/2020       Objective     /84 (BP Location: Left arm, Patient Position: Sitting, Cuff Size: Adult)   Pulse 71   Temp (!) 97 4 °F (36 3 °C) (Tympanic)   Resp 16   Ht 5' 2\" (1 575 m)   Wt 65 8 kg (145 lb)   SpO2 99%   BMI 26 52 kg/m²     Physical Exam  Vitals and nursing note reviewed  Constitutional:       Appearance: She is well-developed  HENT:      Head: Normocephalic and atraumatic  Eyes:      Pupils: Pupils are equal, round, and reactive to light  Cardiovascular:      Rate and Rhythm: Normal rate and regular rhythm  Heart sounds: Normal heart sounds  Pulmonary:      Effort: Pulmonary effort is normal       Breath sounds: Normal breath sounds  Abdominal:      General: Bowel sounds are normal       Palpations: Abdomen is soft  Musculoskeletal:         General: Normal range of motion  Cervical back: Normal range of motion and neck supple  Lymphadenopathy:      Cervical: No cervical adenopathy  Skin:     General: Skin is warm  Neurological:      Mental Status: She is alert and oriented to person, place, and time        Cranial " Nerves: No cranial nerve deficit         Danitza Chavis MD

## 2023-06-02 NOTE — ASSESSMENT & PLAN NOTE
Fair control on muscle relaxant and anti-inflammatory  Continue with low back exercises  Continue to monitor

## 2023-06-02 NOTE — ASSESSMENT & PLAN NOTE
Not well controlled  Discussed about low-fat diet and regular exercise  I am going to check fasting lipid profile

## 2023-06-15 ENCOUNTER — APPOINTMENT (OUTPATIENT)
Dept: LAB | Age: 63
End: 2023-06-15
Payer: COMMERCIAL

## 2023-06-15 DIAGNOSIS — E78.00 HIGH CHOLESTEROL: ICD-10-CM

## 2023-06-15 DIAGNOSIS — E55.9 VITAMIN D INSUFFICIENCY: ICD-10-CM

## 2023-06-15 LAB
25(OH)D3 SERPL-MCNC: 42.6 NG/ML (ref 30–100)
ALBUMIN SERPL BCP-MCNC: 3.5 G/DL (ref 3.5–5)
ALP SERPL-CCNC: 41 U/L (ref 46–116)
ALT SERPL W P-5'-P-CCNC: 27 U/L (ref 12–78)
ANION GAP SERPL CALCULATED.3IONS-SCNC: 3 MMOL/L (ref 4–13)
AST SERPL W P-5'-P-CCNC: 29 U/L (ref 5–45)
BASOPHILS # BLD AUTO: 0.03 THOUSANDS/ÂΜL (ref 0–0.1)
BASOPHILS NFR BLD AUTO: 1 % (ref 0–1)
BILIRUB SERPL-MCNC: 0.44 MG/DL (ref 0.2–1)
BUN SERPL-MCNC: 22 MG/DL (ref 5–25)
CALCIUM SERPL-MCNC: 8.8 MG/DL (ref 8.3–10.1)
CHLORIDE SERPL-SCNC: 110 MMOL/L (ref 96–108)
CHOLEST SERPL-MCNC: 210 MG/DL
CO2 SERPL-SCNC: 27 MMOL/L (ref 21–32)
CREAT SERPL-MCNC: 0.51 MG/DL (ref 0.6–1.3)
EOSINOPHIL # BLD AUTO: 0.11 THOUSAND/ÂΜL (ref 0–0.61)
EOSINOPHIL NFR BLD AUTO: 2 % (ref 0–6)
ERYTHROCYTE [DISTWIDTH] IN BLOOD BY AUTOMATED COUNT: 12.9 % (ref 11.6–15.1)
GFR SERPL CREATININE-BSD FRML MDRD: 102 ML/MIN/1.73SQ M
GLUCOSE P FAST SERPL-MCNC: 102 MG/DL (ref 65–99)
HCT VFR BLD AUTO: 40.6 % (ref 34.8–46.1)
HDLC SERPL-MCNC: 79 MG/DL
HGB BLD-MCNC: 13.5 G/DL (ref 11.5–15.4)
IMM GRANULOCYTES # BLD AUTO: 0.02 THOUSAND/UL (ref 0–0.2)
IMM GRANULOCYTES NFR BLD AUTO: 0 % (ref 0–2)
LDLC SERPL CALC-MCNC: 120 MG/DL (ref 0–100)
LYMPHOCYTES # BLD AUTO: 1.46 THOUSANDS/ÂΜL (ref 0.6–4.47)
LYMPHOCYTES NFR BLD AUTO: 25 % (ref 14–44)
MCH RBC QN AUTO: 31.6 PG (ref 26.8–34.3)
MCHC RBC AUTO-ENTMCNC: 33.3 G/DL (ref 31.4–37.4)
MCV RBC AUTO: 95 FL (ref 82–98)
MONOCYTES # BLD AUTO: 0.42 THOUSAND/ÂΜL (ref 0.17–1.22)
MONOCYTES NFR BLD AUTO: 7 % (ref 4–12)
NEUTROPHILS # BLD AUTO: 3.77 THOUSANDS/ÂΜL (ref 1.85–7.62)
NEUTS SEG NFR BLD AUTO: 65 % (ref 43–75)
NRBC BLD AUTO-RTO: 0 /100 WBCS
PLATELET # BLD AUTO: 381 THOUSANDS/UL (ref 149–390)
PMV BLD AUTO: 9.6 FL (ref 8.9–12.7)
POTASSIUM SERPL-SCNC: 3.8 MMOL/L (ref 3.5–5.3)
PROT SERPL-MCNC: 7 G/DL (ref 6.4–8.4)
RBC # BLD AUTO: 4.27 MILLION/UL (ref 3.81–5.12)
SODIUM SERPL-SCNC: 140 MMOL/L (ref 135–147)
TRIGL SERPL-MCNC: 56 MG/DL
TSH SERPL DL<=0.05 MIU/L-ACNC: 1.15 UIU/ML (ref 0.45–4.5)
WBC # BLD AUTO: 5.81 THOUSAND/UL (ref 4.31–10.16)

## 2023-06-15 PROCEDURE — 84443 ASSAY THYROID STIM HORMONE: CPT

## 2023-06-15 PROCEDURE — 82306 VITAMIN D 25 HYDROXY: CPT

## 2023-06-15 PROCEDURE — 80053 COMPREHEN METABOLIC PANEL: CPT

## 2023-06-15 PROCEDURE — 80061 LIPID PANEL: CPT

## 2023-06-15 PROCEDURE — 36415 COLL VENOUS BLD VENIPUNCTURE: CPT

## 2023-06-15 PROCEDURE — 85025 COMPLETE CBC W/AUTO DIFF WBC: CPT

## 2023-07-05 ENCOUNTER — TELEPHONE (OUTPATIENT)
Dept: FAMILY MEDICINE CLINIC | Facility: CLINIC | Age: 63
End: 2023-07-05

## 2023-07-05 NOTE — TELEPHONE ENCOUNTER
Please see below message. Pt has been watching her diet and also following the keto diet. She is upset because her sugar was most likely elevated do to prednisone. We suggested doing research online on what foods to eat. She wants us to give her diet sheets.   Please advise  I did leave message for pt to call office

## 2023-07-05 NOTE — TELEPHONE ENCOUNTER
Patient returned call and was given lab results and recommendations. She was upset because she already follows a low carb diet and has been on the "keto" diet for a long time. Also was on steroids for the past couple weeks and we should have know this.   Was frustrated that she cannot get in to see her doctor until August.    I suggested she google diet information but she did not want to do that and wanted us to give her diet information

## 2023-07-05 NOTE — TELEPHONE ENCOUNTER
----- Message from Denise Wall MD sent at 7/5/2023  6:54 AM EDT -----  Blood work showed elevated cholesterol and fasting glucose. I recommend low-carb and low-fat diet. All the rest of blood work came back normal.  We will continue to monitor.

## 2023-07-06 ENCOUNTER — TELEPHONE (OUTPATIENT)
Dept: FAMILY MEDICINE CLINIC | Facility: CLINIC | Age: 63
End: 2023-07-06

## 2023-07-06 RX ORDER — PREDNISONE 10 MG/1
TABLET ORAL
COMMUNITY
Start: 2023-06-28

## 2023-07-06 RX ORDER — METHYLPREDNISOLONE 4 MG/1
TABLET ORAL
COMMUNITY
Start: 2023-06-20

## 2023-07-06 RX ORDER — TRIAMCINOLONE ACETONIDE 1 MG/G
CREAM TOPICAL
COMMUNITY
Start: 2023-06-28

## 2023-07-06 NOTE — TELEPHONE ENCOUNTER
Patient will be continued sooner to discuss her blood work results. Okay to double book the patient and come in sooner. Please replace referral to see dietitian if she wants to know more details about low-fat diet. Her fasting glucose most likely was slightly elevated due to the prednisone.

## 2023-07-10 NOTE — TELEPHONE ENCOUNTER
Patient returned call, difficult for her to call from work. She said she is doing better and does not need to come in early or to see a dietician.   She feels everything was due to the prednisone

## 2023-08-15 DIAGNOSIS — E78.00 HIGH CHOLESTEROL: ICD-10-CM

## 2023-08-15 RX ORDER — OMEGA-3-ACID ETHYL ESTERS 1 G/1
2 CAPSULE, LIQUID FILLED ORAL 2 TIMES DAILY
Qty: 120 CAPSULE | Refills: 3 | Status: SHIPPED | OUTPATIENT
Start: 2023-08-15

## 2023-10-02 ENCOUNTER — APPOINTMENT (EMERGENCY)
Dept: RADIOLOGY | Facility: HOSPITAL | Age: 63
End: 2023-10-02
Payer: COMMERCIAL

## 2023-10-02 ENCOUNTER — HOSPITAL ENCOUNTER (EMERGENCY)
Facility: HOSPITAL | Age: 63
Discharge: HOME | End: 2023-10-02
Attending: EMERGENCY MEDICINE | Admitting: EMERGENCY MEDICINE
Payer: COMMERCIAL

## 2023-10-02 VITALS
TEMPERATURE: 97.7 F | HEART RATE: 60 BPM | SYSTOLIC BLOOD PRESSURE: 134 MMHG | OXYGEN SATURATION: 98 % | DIASTOLIC BLOOD PRESSURE: 64 MMHG | RESPIRATION RATE: 21 BRPM

## 2023-10-02 DIAGNOSIS — R07.9 CHEST PAIN: Primary | ICD-10-CM

## 2023-10-02 LAB
2HR DELTA HS TROPONIN: 0 NG/L
ALBUMIN SERPL BCP-MCNC: 3.9 G/DL (ref 3.5–5)
ALP SERPL-CCNC: 41 U/L (ref 34–104)
ALT SERPL W P-5'-P-CCNC: 10 U/L (ref 7–52)
ANION GAP SERPL CALCULATED.3IONS-SCNC: 5 MMOL/L
AST SERPL W P-5'-P-CCNC: 19 U/L (ref 13–39)
BASOPHILS # BLD AUTO: 0.02 THOUSANDS/ÂΜL (ref 0–0.1)
BASOPHILS NFR BLD AUTO: 0 % (ref 0–1)
BILIRUB SERPL-MCNC: 0.19 MG/DL (ref 0.2–1)
BUN SERPL-MCNC: 16 MG/DL (ref 5–25)
CALCIUM SERPL-MCNC: 8.9 MG/DL (ref 8.4–10.2)
CARDIAC TROPONIN I PNL SERPL HS: 3 NG/L
CARDIAC TROPONIN I PNL SERPL HS: 3 NG/L
CHLORIDE SERPL-SCNC: 105 MMOL/L (ref 96–108)
CO2 SERPL-SCNC: 29 MMOL/L (ref 21–32)
CREAT SERPL-MCNC: 0.67 MG/DL (ref 0.6–1.3)
EOSINOPHIL # BLD AUTO: 0.18 THOUSAND/ÂΜL (ref 0–0.61)
EOSINOPHIL NFR BLD AUTO: 3 % (ref 0–6)
ERYTHROCYTE [DISTWIDTH] IN BLOOD BY AUTOMATED COUNT: 12.5 % (ref 11.6–15.1)
GFR SERPL CREATININE-BSD FRML MDRD: 93 ML/MIN/1.73SQ M
GLUCOSE SERPL-MCNC: 143 MG/DL (ref 65–140)
HCT VFR BLD AUTO: 36.9 % (ref 34.8–46.1)
HGB BLD-MCNC: 12.1 G/DL (ref 11.5–15.4)
IMM GRANULOCYTES # BLD AUTO: 0.01 THOUSAND/UL (ref 0–0.2)
IMM GRANULOCYTES NFR BLD AUTO: 0 % (ref 0–2)
LYMPHOCYTES # BLD AUTO: 2.12 THOUSANDS/ÂΜL (ref 0.6–4.47)
LYMPHOCYTES NFR BLD AUTO: 29 % (ref 14–44)
MCH RBC QN AUTO: 31.3 PG (ref 26.8–34.3)
MCHC RBC AUTO-ENTMCNC: 32.8 G/DL (ref 31.4–37.4)
MCV RBC AUTO: 96 FL (ref 82–98)
MONOCYTES # BLD AUTO: 0.62 THOUSAND/ÂΜL (ref 0.17–1.22)
MONOCYTES NFR BLD AUTO: 9 % (ref 4–12)
NEUTROPHILS # BLD AUTO: 4.36 THOUSANDS/ÂΜL (ref 1.85–7.62)
NEUTS SEG NFR BLD AUTO: 59 % (ref 43–75)
NRBC BLD AUTO-RTO: 0 /100 WBCS
PLATELET # BLD AUTO: 341 THOUSANDS/UL (ref 149–390)
PMV BLD AUTO: 8.9 FL (ref 8.9–12.7)
POTASSIUM SERPL-SCNC: 3.6 MMOL/L (ref 3.5–5.3)
PROT SERPL-MCNC: 6 G/DL (ref 6.4–8.4)
RBC # BLD AUTO: 3.86 MILLION/UL (ref 3.81–5.12)
SODIUM SERPL-SCNC: 139 MMOL/L (ref 135–147)
WBC # BLD AUTO: 7.31 THOUSAND/UL (ref 4.31–10.16)

## 2023-10-02 PROCEDURE — 84484 ASSAY OF TROPONIN QUANT: CPT | Performed by: EMERGENCY MEDICINE

## 2023-10-02 PROCEDURE — 36415 COLL VENOUS BLD VENIPUNCTURE: CPT

## 2023-10-02 PROCEDURE — 99285 EMERGENCY DEPT VISIT HI MDM: CPT

## 2023-10-02 PROCEDURE — 99285 EMERGENCY DEPT VISIT HI MDM: CPT | Performed by: EMERGENCY MEDICINE

## 2023-10-02 PROCEDURE — 80053 COMPREHEN METABOLIC PANEL: CPT | Performed by: EMERGENCY MEDICINE

## 2023-10-02 PROCEDURE — 93005 ELECTROCARDIOGRAM TRACING: CPT

## 2023-10-02 PROCEDURE — 85025 COMPLETE CBC W/AUTO DIFF WBC: CPT | Performed by: EMERGENCY MEDICINE

## 2023-10-02 PROCEDURE — 71046 X-RAY EXAM CHEST 2 VIEWS: CPT

## 2023-10-02 NOTE — ED ATTENDING ATTESTATION
Jani Pierce MD, saw and evaluated the patient. I have discussed the patient with the resident and agree with the resident's findings, Plan of Care, and MDM as documented in the resident's note, except where noted. All available labs and Radiology studies were reviewed. I was present for key portions of any procedure(s) performed by the resident and I was immediately available to provide assistance. At this point I agree with the current assessment done in the Emergency Department. I have conducted an independent evaluation of this patient a history and physical is as follows:    60 yo obese female with a history of chronic low back pain, anxiety, high cholesterol, and prior hysterectomy presents to the ED complaining of chest pain since this morning around 1100. The patient reports a midsternal chest "tightness", nonradiating and nonexertional. (+) Mild associated shortness of breath. No nausea, vomiting, or diaphoresis. Daughter administered a dose of ASA at home --> chest pain now "much better". No LE swelling or pain. No cough or hemoptysis. No other specific complaints. ROS: per resident physician note    Gen: NAD, AA&Ox3  HEENT: PERRL, EOMI  Neck: supple  CV: RRR  Lungs: CTA B/L  Abdomen: soft, NT/ND  Ext: no swelling or deformity  Neuro: 5/5 strength all extremities, sensation grossly intact  Skin: no rash    ED Course  The patient is comfortable appearing with stable vital signs and a benign physical examination. Chest pain much improved after 81 mg of ASA. ACS vs TAD vs PE vs musculoskeletal chest pain vs anxiety? Will check EKG, CXR, basic labs, and troponin. The patient declined any other medications/interventions. Will continue to monitor in the ED. Disposition per workup and reassessment.       Critical Care Time  Procedures

## 2023-10-02 NOTE — ED PROVIDER NOTES
History  Chief Complaint   Patient presents with   • Chest Pain     C/o of chest pressure since this more, has gotten worse throughout the day and stays central to her heart. Daughter had her take an ASA, 81mg, pt states the ASA helped. States she has a heart murmur. HPI    ED Course as of 10/02/23 2112   Mon Oct 02, 2023   1953 Blood Pressure: 132/59   1953 Temperature: 97.7 °F (36.5 °C)   1953 Temp Source: Temporal   1953 Pulse: 78   1953 Respirations: 17   1953 SpO2: 97 %   233 44-year-old female with past medical history significant for back pain presents to the ED for evaluation of left-sided chest pain. Patient reports sudden onset of left-sided chest pain when she woke up around 11 AM this morning. Patient describes it as a sharp pain that lasted less than 1 minute. Did not take anything for the pain. Patient reports feeling cold and clammy at the time, that lasted about 20 minutes. Denies any associated shortness of breath or dizziness. Patient reports return of left-sided chest pain around 4 PM this afternoon. She describes it as a squeezing type of sensation that is not as intense as the episode this morning. Patient reports taking 81 mg of aspirin and reports improvement of the pain. Patient reports having minimal chest discomfort at this time. Denies fever, chills, cough, congestion, SOB, palpitations, abdominal pain, N/V, headache, dizziness. Reports recent illness with cold-like sxs 2 weeks ago, symptoms have since resolved. Denies history of cardiovascular disease or PE/DVT. No recent long travels, hospitalization, h/o malignancy. Hemodynamically stable. Afebrile. Patient appears in no acute distress. Chest pain not reproducible. Benign physical exam.  Differential diagnosis includes but not limited to ACS, pericarditis, pneumonia, costochondritis. First nurse labs ordered during triage. CBC, BMP, troponin, EKG, CXR ordered. Offered analgesia and patient declined at this time.  WBC: 7.31  wnl    Hemoglobin: 12.1  wnl    Platelet Count: 758  wnl    hs TnI 0hr: 3  No need for repeat given CP started >3 hrs ago    2005 Procedure Note: EKG  Date/Time: 10/02/23 8:05 PM   Interpreted by: Humble Bustillo  Indications / Diagnosis: CP  ECG reviewed by me, the ED Provider: yes   The EKG demonstrates:  Rhythm: normal sinus  Intervals: normal intervals  Axis: normal axis  QRS/Blocks: normal QRS  ST Changes: No acute ST Changes, no STD/ELDER.       Awaiting CXR to be done    Delta 2hr hsTnI: 0   0 Stable for discharge. Strict return to ED precautions given. Advised patient to follow-up with PCP within 2 to 3 days for reevaluation. Patient and daughter verbalized understanding and agrees with plan of care. Prior to Admission Medications   Prescriptions Last Dose Informant Patient Reported? Taking?    Cholecalciferol 50 MCG ( UT) CAPS   Yes No   Si capsule   Multiple Vitamins-Minerals (ONE-A-DAY WOMENS 50 PLUS PO)   Yes No   Sig: Take by mouth daily   Xiidra 5 % op solution   Yes No   Sig: INSTILL 1 DROP INTO BOTH EYES TWICE A DAY   aspirin 81 mg chewable tablet   Yes No   Sig: Chew 81 mg daily   cyclobenzaprine (FLEXERIL) 5 mg tablet   Yes No   estrogens, conjugated (Premarin) vaginal cream   No No   Sig: Insert 1 g into the vagina daily   meloxicam (MOBIC) 7.5 mg tablet   Yes No   methylPREDNISolone 4 MG tablet therapy pack   Yes No   Sig: As directed   omega-3-acid ethyl esters (LOVAZA) 1 g capsule   No No   Sig: TAKE 2 CAPSULES BY MOUTH TWO TIMES DAILY   predniSONE 10 mg tablet   Yes No   Sig: TAKE 6 TABLETS BY MOUTH DAILY FOR 2 DAYS, THEN 5 TABLETS DAILY FOR 2 DAYS, THEN 4 TABLETS DAILY FOR 2 DAYS, THEN 3 TABLETS DAILY FOR 2 DAYS,   triamcinolone (KENALOG) 0.1 % cream   Yes No   Sig: APPLY TOPICALLY TWO TIMES DAILY      Facility-Administered Medications: None       Past Medical History:   Diagnosis Date   • Back pain        Past Surgical History:   Procedure Laterality Date   • CARPAL TUNNEL RELEASE     • HYSTERECTOMY     • PLANTAR FASCIECTOMY         Family History   Problem Relation Age of Onset   • Colon cancer Mother    • Cervical cancer Sister    • Heart attack Brother      I have reviewed and agree with the history as documented. E-Cigarette/Vaping   • E-Cigarette Use Never User      E-Cigarette/Vaping Substances     Social History     Tobacco Use   • Smoking status: Never   • Smokeless tobacco: Never   Vaping Use   • Vaping Use: Never used   Substance Use Topics   • Alcohol use: Yes     Alcohol/week: 1.0 - 2.0 standard drink of alcohol     Types: 1 - 2 Glasses of wine per week        Review of Systems   Constitutional: Negative for chills and fever. HENT: Negative for congestion, ear pain, rhinorrhea and sore throat. Eyes: Negative for pain and visual disturbance. Respiratory: Negative for cough and shortness of breath. Cardiovascular: Positive for chest pain. Negative for palpitations. Gastrointestinal: Negative for abdominal pain, diarrhea, nausea and vomiting. Genitourinary: Negative for dysuria and hematuria. Musculoskeletal: Negative for arthralgias and back pain. Skin: Negative for color change and rash. Neurological: Negative for dizziness, seizures, syncope, weakness, light-headedness and headaches. All other systems reviewed and are negative.       Physical Exam  ED Triage Vitals   Temperature Pulse Respirations Blood Pressure SpO2   10/02/23 1742 10/02/23 1742 10/02/23 1742 10/02/23 1742 10/02/23 1742   97.7 °F (36.5 °C) 78 17 132/59 97 %      Temp Source Heart Rate Source Patient Position - Orthostatic VS BP Location FiO2 (%)   10/02/23 1742 10/02/23 1959 10/02/23 1959 10/02/23 1959 --   Temporal Monitor Lying Left arm       Pain Score       10/02/23 1742       No Pain             Orthostatic Vital Signs  Vitals:    10/02/23 1742 10/02/23 1959 10/02/23 2100   BP: 132/59 116/55 134/64   Pulse: 78 58 60   Patient Position - Orthostatic VS:  Lying        Physical Exam  Vitals and nursing note reviewed. Constitutional:       General: She is not in acute distress. Appearance: Normal appearance. She is well-developed. She is not ill-appearing, toxic-appearing or diaphoretic. HENT:      Head: Normocephalic and atraumatic. Mouth/Throat:      Mouth: Mucous membranes are moist.      Pharynx: Oropharynx is clear. Eyes:      Extraocular Movements: Extraocular movements intact. Conjunctiva/sclera: Conjunctivae normal.      Pupils: Pupils are equal, round, and reactive to light. Cardiovascular:      Rate and Rhythm: Normal rate and regular rhythm. Pulses: Normal pulses. Heart sounds: Normal heart sounds. No murmur heard. Pulmonary:      Effort: Pulmonary effort is normal. No respiratory distress. Breath sounds: Normal breath sounds. No stridor. No wheezing, rhonchi or rales. Chest:      Chest wall: No tenderness. Abdominal:      Palpations: Abdomen is soft. Tenderness: There is no abdominal tenderness. Musculoskeletal:         General: No swelling. Cervical back: Neck supple. Skin:     General: Skin is warm and dry. Capillary Refill: Capillary refill takes less than 2 seconds. Neurological:      General: No focal deficit present. Mental Status: She is alert and oriented to person, place, and time.    Psychiatric:         Mood and Affect: Mood normal.         ED Medications  Medications - No data to display    Diagnostic Studies  Results Reviewed     Procedure Component Value Units Date/Time    HS Troponin I 2hr [[id number]]  (Normal) Collected: 10/02/23 1959    Lab Status: Final result Specimen: Blood from Line, Venous Updated: 10/02/23 2033     hs TnI 2hr 3 ng/L      Delta 2hr hsTnI 0 ng/L     HS Troponin I 4hr [[id number]]     Lab Status: No result Specimen: Blood     HS Troponin 0hr (reflex protocol) [[id number]]  (Normal) Collected: 10/02/23 1746    Lab Status: Final result Specimen: Blood from Arm, Left Updated: 10/02/23 1826     hs TnI 0hr 3 ng/L     Comprehensive metabolic panel [[id number]]  (Abnormal) Collected: 10/02/23 1746    Lab Status: Final result Specimen: Blood from Arm, Left Updated: 10/02/23 1820     Sodium 139 mmol/L      Potassium 3.6 mmol/L      Chloride 105 mmol/L      CO2 29 mmol/L      ANION GAP 5 mmol/L      BUN 16 mg/dL      Creatinine 0.67 mg/dL      Glucose 143 mg/dL      Calcium 8.9 mg/dL      AST 19 U/L      ALT 10 U/L      Alkaline Phosphatase 41 U/L      Total Protein 6.0 g/dL      Albumin 3.9 g/dL      Total Bilirubin 0.19 mg/dL      eGFR 93 ml/min/1.73sq m     Narrative:      National Kidney Disease Foundation guidelines for Chronic Kidney Disease (CKD):   •  Stage 1 with normal or high GFR (GFR > 90 mL/min/1.73 square meters)  •  Stage 2 Mild CKD (GFR = 60-89 mL/min/1.73 square meters)  •  Stage 3A Moderate CKD (GFR = 45-59 mL/min/1.73 square meters)  •  Stage 3B Moderate CKD (GFR = 30-44 mL/min/1.73 square meters)  •  Stage 4 Severe CKD (GFR = 15-29 mL/min/1.73 square meters)  •  Stage 5 End Stage CKD (GFR <15 mL/min/1.73 square meters)  Note: GFR calculation is accurate only with a steady state creatinine    CBC and differential [[id number]] Collected: 10/02/23 1746    Lab Status: Final result Specimen: Blood from Arm, Left Updated: 10/02/23 1759     WBC 7.31 Thousand/uL      RBC 3.86 Million/uL      Hemoglobin 12.1 g/dL      Hematocrit 36.9 %      MCV 96 fL      MCH 31.3 pg      MCHC 32.8 g/dL      RDW 12.5 %      MPV 8.9 fL      Platelets 667 Thousands/uL      nRBC 0 /100 WBCs      Neutrophils Relative 59 %      Immat GRANS % 0 %      Lymphocytes Relative 29 %      Monocytes Relative 9 %      Eosinophils Relative 3 %      Basophils Relative 0 %      Neutrophils Absolute 4.36 Thousands/µL      Immature Grans Absolute 0.01 Thousand/uL      Lymphocytes Absolute 2.12 Thousands/µL      Monocytes Absolute 0.62 Thousand/µL      Eosinophils Absolute 0.18 Thousand/µL      Basophils Absolute 0.02 Thousands/µL                  XR chest 2 views   ED Interpretation by Juwan Mendez MD (10/02 2109)   No acute cardiopulmonary disease            Procedures  Procedures            HEART Risk Score    Flowsheet Row Most Recent Value   Heart Score Risk Calculator    History 0 Filed at: 10/02/2023 2050   ECG 1 Filed at: 10/02/2023 2050   Age 1 Filed at: 10/02/2023 2050   Risk Factors 1 Filed at: 10/02/2023 2050   Troponin 0 Filed at: 10/02/2023 2050   HEART Score 3 Filed at: 10/02/2023 2050                                Medical Decision Making  Amount and/or Complexity of Data Reviewed  Labs: ordered. Decision-making details documented in ED Course. Radiology: ordered and independent interpretation performed. See HPI      Disposition  Final diagnoses:   Chest pain     Time reflects when diagnosis was documented in both MDM as applicable and the Disposition within this note     Time User Action Codes Description Comment    10/2/2023  8:50 PM Juwan Mendez T Add [R07.9] Chest pain       ED Disposition     ED Disposition   Discharge    Condition   Stable    Date/Time   Mon Oct 2, 2023  8:50 PM    [address] discharge to home/self care.                Follow-up Information     Follow up With Specialties Details Why Contact Info Additional Information    John Paul Hammer MD Family Medicine   [address] [facility]  [address] [facility]  [phone]       [facility] Emergency Department Emergency Medicine  If symptoms worsen [address] [id number]  [facility] Emergency Department, [address], [facility]          Discharge Medication List as of 10/2/2023  8:52 PM      CONTINUE these medications which have NOT CHANGED    Details   aspirin 81 mg chewable tablet Chew 81 mg daily, Historical Med      Cholecalciferol 50 MCG (2000 UT) CAPS 1 capsule, Historical Med      cyclobenzaprine (FLEXERIL) 5 mg tablet Starting Tue 11/1/2022, Historical Med      estrogens, conjugated (Premarin) vaginal cream Insert 1 g into the vagina daily, Starting Tue 12/27/2022, Normal      meloxicam (MOBIC) 7.5 mg tablet Starting Tue 11/1/2022, Historical Med      methylPREDNISolone 4 MG tablet therapy pack As directed, Historical Med      Multiple Vitamins-Minerals (ONE-A-DAY WOMENS 50 PLUS PO) Take by mouth daily, Historical Med      omega-3-acid ethyl esters (LOVAZA) 1 g capsule TAKE 2 CAPSULES BY MOUTH TWO TIMES DAILY, Starting Tue 8/15/2023, Normal      predniSONE 10 mg tablet TAKE 6 TABLETS BY MOUTH DAILY FOR 2 DAYS, THEN 5 TABLETS DAILY FOR 2 DAYS, THEN 4 TABLETS DAILY FOR 2 DAYS, THEN 3 TABLETS DAILY FOR 2 DAYS,, Historical Med      triamcinolone (KENALOG) 0.1 % cream APPLY TOPICALLY TWO TIMES DAILY, Historical Med      Xiidra 5 % op solution INSTILL 1 DROP INTO BOTH EYES TWICE A DAY, Historical Med           No discharge procedures on file. PDMP Review     None           ED Provider  Attending physically available and evaluated Max Ocampo. I managed the patient along with the ED Attending.     Electronically Signed by         Dana Schneider MD  10/02/23 2112

## 2023-10-03 ENCOUNTER — OFFICE VISIT (OUTPATIENT)
Dept: FAMILY MEDICINE CLINIC | Facility: CLINIC | Age: 63
End: 2023-10-03
Payer: COMMERCIAL

## 2023-10-03 ENCOUNTER — TELEPHONE (OUTPATIENT)
Dept: OTHER | Facility: OTHER | Age: 63
End: 2023-10-03

## 2023-10-03 VITALS
RESPIRATION RATE: 16 BRPM | BODY MASS INDEX: 26.91 KG/M2 | TEMPERATURE: 97.9 F | WEIGHT: 146.2 LBS | OXYGEN SATURATION: 97 % | HEART RATE: 95 BPM | DIASTOLIC BLOOD PRESSURE: 82 MMHG | HEIGHT: 62 IN | SYSTOLIC BLOOD PRESSURE: 120 MMHG

## 2023-10-03 DIAGNOSIS — G47.00 INSOMNIA, UNSPECIFIED TYPE: ICD-10-CM

## 2023-10-03 DIAGNOSIS — F41.9 ANXIETY: Primary | ICD-10-CM

## 2023-10-03 LAB
ATRIAL RATE: 73 BPM
P AXIS: 72 DEGREES
PR INTERVAL: 148 MS
QRS AXIS: 53 DEGREES
QRSD INTERVAL: 96 MS
QT INTERVAL: 386 MS
QTC INTERVAL: 425 MS
T WAVE AXIS: 57 DEGREES
VENTRICULAR RATE: 73 BPM

## 2023-10-03 PROCEDURE — 93010 ELECTROCARDIOGRAM REPORT: CPT | Performed by: INTERNAL MEDICINE

## 2023-10-03 PROCEDURE — 99214 OFFICE O/P EST MOD 30 MIN: CPT | Performed by: NURSE PRACTITIONER

## 2023-10-03 RX ORDER — ALPRAZOLAM 0.5 MG/1
0.5 TABLET ORAL
Qty: 30 TABLET | Refills: 0 | Status: SHIPPED | OUTPATIENT
Start: 2023-10-03

## 2023-10-03 NOTE — ASSESSMENT & PLAN NOTE
- Not well controlled. Related to increased anxiety. - Prescription sent for Xanax 0.5 mg at bedtime as needed. Discussed side effects.  - Will continue to monitor.

## 2023-10-03 NOTE — PROGRESS NOTES
Name: Martha Burgos      : 1960      MRN: [id number]  Encounter Provider: JEREMIAH Mcdonald  Encounter Date: 10/3/2023   Encounter department: [address] [place] [address] 15  PRIMARY CARE    Assessment & Plan     1. Anxiety  Assessment & Plan:  - Not well controlled. - Prescription sent for Xanax 0.5 mg at bedtime as needed. Discussed side effects.  - Will continue to monitor. Orders:  -     ALPRAZolam (XANAX) 0.5 mg tablet; Take 1 tablet (0.5 mg total) by mouth daily at bedtime as needed for anxiety    2. Insomnia, unspecified type  Assessment & Plan:  - Not well controlled. Related to increased anxiety. - Prescription sent for Xanax 0.5 mg at bedtime as needed. Discussed side effects.  - Will continue to monitor. Subjective     Patient presents to office today for ER follow up. Was seen on 10/2 with complaints of chest pain. Labs, EKG, and chest xray were all unremarkable. She was discharged home in stable condition. She complains of increased anxiety and difficulty sleeping for the past 2 weeks. Feels this is related to changes going on at her job. States that her anxiety is ok during the day but increases when she tries to go to sleep. Feels like she can't shut her mind off. She has taken Xanax in the past. She denies any other concerns or complaints today. Review of Systems   Constitutional: Negative for fatigue and fever. HENT: Negative for trouble swallowing. Eyes: Negative for visual disturbance. Respiratory: Negative for cough and shortness of breath. Cardiovascular: Negative for chest pain and palpitations. Gastrointestinal: Negative for abdominal pain and blood in stool. Endocrine: Negative for cold intolerance and heat intolerance. Genitourinary: Negative for difficulty urinating and dysuria. Musculoskeletal: Negative for gait problem. Skin: Negative for rash. Neurological: Negative for dizziness, syncope and headaches.    Hematological: Negative for adenopathy. Psychiatric/Behavioral: Positive for sleep disturbance. Negative for behavioral problems, self-injury and suicidal ideas. The patient is nervous/anxious. Past Medical History:   Diagnosis Date   • Back pain      Past Surgical History:   Procedure Laterality Date   • CARPAL TUNNEL RELEASE     • HYSTERECTOMY     • PLANTAR FASCIECTOMY       Family History   Problem Relation Age of Onset   • Colon cancer Mother    • Cervical cancer Sister    • Heart attack Brother      Social History     Socioeconomic History   • Marital status: Single     Spouse name: None   • Number of children: None   • Years of education: None   • Highest education level: None   Occupational History   • None   Tobacco Use   • Smoking status: Never   • Smokeless tobacco: Never   Vaping Use   • Vaping Use: Never used   Substance and Sexual Activity   • Alcohol use:  Yes     Alcohol/week: 1.0 - 2.0 standard drink of alcohol     Types: 1 - 2 Glasses of wine per week   • Drug use: None   • Sexual activity: None   Other Topics Concern   • None   Social History Narrative   • None     Social Determinants of Health     Financial Resource Strain: Not on file   Food Insecurity: Not on file   Transportation Needs: Not on file   Physical Activity: Not on file   Stress: Not on file   Social Connections: Not on file   Intimate Partner Violence: Not on file   Housing Stability: Not on file     Current Outpatient Medications on File Prior to Visit   Medication Sig   • aspirin 81 mg chewable tablet Chew 81 mg daily   • Cholecalciferol 50 MCG (2000 UT) CAPS 1 capsule   • cyclobenzaprine (FLEXERIL) 5 mg tablet    • estrogens, conjugated (Premarin) vaginal cream Insert 1 g into the vagina daily   • meloxicam (MOBIC) 7.5 mg tablet    • methylPREDNISolone 4 MG tablet therapy pack As directed   • Multiple Vitamins-Minerals (ONE-A-DAY WOMENS 50 PLUS PO) Take by mouth daily   • omega-3-acid ethyl esters (LOVAZA) 1 g capsule TAKE 2 CAPSULES BY MOUTH TWO TIMES DAILY   • predniSONE 10 mg tablet TAKE 6 TABLETS BY MOUTH DAILY FOR 2 DAYS, THEN 5 TABLETS DAILY FOR 2 DAYS, THEN 4 TABLETS DAILY FOR 2 DAYS, THEN 3 TABLETS DAILY FOR 2 DAYS,   • triamcinolone (KENALOG) 0.1 % cream APPLY TOPICALLY TWO TIMES DAILY   • Xiidra 5 % op solution INSTILL 1 DROP INTO BOTH EYES TWICE A DAY     Allergies   Allergen Reactions   • Gabapentin Anaphylaxis     Immunization History   Administered Date(s) Administered   • COVID-19 MODERNA VACC 0.5 ML IM 03/15/2021, 04/12/2021, 11/23/2021   • INFLUENZA 09/26/2013, 09/27/2019, 08/25/2020, 11/09/2021, 11/07/2022   • Influenza Quadrivalent Preservative Free 3 years and older IM 08/25/2020   • Tdap 10/31/2019   • influenza, injectable, quadrivalent 08/24/2020       Objective     /82 (BP Location: Left arm, Patient Position: Sitting, Cuff Size: Large)   Pulse 95   Temp 97.9 °F (36.6 °C) (Tympanic)   Resp 16   Ht 5' 2" (1.575 m)   Wt 66.3 kg (146 lb 3.2 oz)   SpO2 97%   BMI 26.74 kg/m²     Physical Exam  Vitals and nursing note reviewed. Constitutional:       General: She is not in acute distress. Appearance: Normal appearance. She is not ill-appearing. HENT:      Head: Normocephalic and atraumatic. Right Ear: External ear normal.      Left Ear: External ear normal.   Eyes:      Conjunctiva/sclera: Conjunctivae normal.   Cardiovascular:      Rate and Rhythm: Normal rate and regular rhythm. Heart sounds: Normal heart sounds. Pulmonary:      Effort: Pulmonary effort is normal.      Breath sounds: Normal breath sounds. Musculoskeletal:         General: Normal range of motion. Cervical back: Normal range of motion. Skin:     General: Skin is warm and dry. Neurological:      Mental Status: She is alert and oriented to person, place, and time.    Psychiatric:         Mood and Affect: Mood normal.         Behavior: Behavior normal.       JEREMIAH Pendleton

## 2023-10-03 NOTE — DISCHARGE INSTRUCTIONS
You were seen in the Emergency Department today for chest pain. Please follow up with your primary care doctor in 2-3 days for re-evaluation. Please return to the Emergency Department if you experience worsening of your current symptoms or any other concerning symptoms including shortness of breath, nausea/vomiting, dizziness.

## 2023-10-03 NOTE — TELEPHONE ENCOUNTER
Patient was seen at the ED last night and would like to be seen in the office today so she can go back to work. She would like a call back ASAP to advise.

## 2023-10-03 NOTE — ASSESSMENT & PLAN NOTE
- Not well controlled. - Prescription sent for Xanax 0.5 mg at bedtime as needed. Discussed side effects.  - Will continue to monitor.

## 2023-12-05 ENCOUNTER — OFFICE VISIT (OUTPATIENT)
Dept: FAMILY MEDICINE CLINIC | Facility: CLINIC | Age: 63
End: 2023-12-05
Payer: COMMERCIAL

## 2023-12-05 VITALS
BODY MASS INDEX: 27.27 KG/M2 | OXYGEN SATURATION: 97 % | SYSTOLIC BLOOD PRESSURE: 120 MMHG | HEIGHT: 62 IN | TEMPERATURE: 98.7 F | WEIGHT: 148.2 LBS | RESPIRATION RATE: 16 BRPM | HEART RATE: 82 BPM | DIASTOLIC BLOOD PRESSURE: 84 MMHG

## 2023-12-05 DIAGNOSIS — E78.00 HIGH CHOLESTEROL: ICD-10-CM

## 2023-12-05 DIAGNOSIS — Z00.01 ABNORMAL WELLNESS EXAM: ICD-10-CM

## 2023-12-05 DIAGNOSIS — F41.9 ANXIETY: ICD-10-CM

## 2023-12-05 DIAGNOSIS — G47.00 INSOMNIA, UNSPECIFIED TYPE: Primary | ICD-10-CM

## 2023-12-05 PROCEDURE — 99214 OFFICE O/P EST MOD 30 MIN: CPT | Performed by: FAMILY MEDICINE

## 2023-12-05 PROCEDURE — 99396 PREV VISIT EST AGE 40-64: CPT | Performed by: FAMILY MEDICINE

## 2023-12-05 RX ORDER — TRAZODONE HYDROCHLORIDE 50 MG/1
50 TABLET ORAL
Qty: 30 TABLET | Refills: 2 | Status: SHIPPED | OUTPATIENT
Start: 2023-12-05

## 2023-12-05 RX ORDER — VIT C/B6/B5/MAGNESIUM/HERB 173 50-5-6-5MG
CAPSULE ORAL EVERY 24 HOURS
COMMUNITY
End: 2023-12-05

## 2023-12-05 NOTE — ASSESSMENT & PLAN NOTE
Not well-controlled. I am going to start her on trazodone 50 mg she will still to take half tablet 1 to 2 hours before bedtime. Discussed with possible side effects. She was told it is not helping she can take the whole pill.

## 2023-12-05 NOTE — PROGRESS NOTES
Name: Diana Roberts      : 1960      MRN: [id number]  Encounter Provider: Edinson Angela MD  Encounter Date: 2023   Encounter department: [facility]     1. Insomnia, unspecified type  Assessment & Plan:  Not well-controlled. I am going to start her on trazodone 50 mg she will still to take half tablet 1 to 2 hours before bedtime. Discussed with possible side effects. She was told it is not helping she can take the whole pill. Orders:  -     traZODone (DESYREL) 50 mg tablet; Take 1 tablet (50 mg total) by mouth daily at bedtime    2. Anxiety  Assessment & Plan:  Well controlled on Xanax 0.5 mg PRN. Continue medication as prescribed. Will continue to monitor. 3. High cholesterol  Assessment & Plan:  Cholesterol 210, . Discussed importance of low fat diet and regular exercise. Will continue to monitor. Orders:  -     CBC and differential; Future  -     Comprehensive metabolic panel; Future  -     Lipid Panel with Direct LDL reflex; Future  -     TSH, 3rd generation with Free T4 reflex; Future    4. Abnormal wellness exam  Assessment & Plan: It was discussed about immunizations, diet, exercise and safety measures. 5. BMI 27.0-27.9,adult        Depression Screening and Follow-up Plan: Patient was screened for depression during today's encounter. They screened negative with a PHQ-2 score of 2. Subjective      RO is a 62 yo female with a PMH of anxiety, presenting to the office for a 6 mo follow up well visit. Blood work on 6/15/23 shows fasting glucose 102, cholesterol 210, . Blood work was reviewed with patient who expresses understanding. Patient reports taking medications as prescribed without any complaints. Patient reports Xanax is managing insomnia but can only sleep 5 more hrs with use. Patient reports maintaining a healthy diet and regular exercise. Patient stops eating after 8 and eat once at noon.  Patient reports 20 min of yoga daily and 30 min walks 2x a week. Patient is UTD on immunizations. Patient reports no complaints today. Anxiety  Patient reports no chest pain, dizziness, nausea, palpitations or shortness of breath. Review of Systems   Constitutional:  Negative for chills, diaphoresis, fatigue and fever. HENT:  Negative for congestion, dental problem, rhinorrhea, sneezing and trouble swallowing. Eyes:  Negative for visual disturbance. Respiratory:  Negative for cough and shortness of breath. Cardiovascular:  Negative for chest pain, palpitations and leg swelling. Gastrointestinal:  Negative for abdominal pain, constipation, diarrhea, nausea and vomiting. Endocrine: Negative for cold intolerance, heat intolerance, polydipsia and polyuria. Genitourinary:  Negative for difficulty urinating and dysuria. Musculoskeletal:  Negative for arthralgias, gait problem and myalgias. Skin:  Negative for rash. Neurological:  Negative for dizziness, tremors, seizures, weakness, light-headedness, numbness and headaches. Hematological:  Negative for adenopathy. Psychiatric/Behavioral:  Negative for behavioral problems.         Current Outpatient Medications on File Prior to Visit   Medication Sig   • ALPRAZolam (XANAX) 0.5 mg tablet Take 1 tablet (0.5 mg total) by mouth daily at bedtime as needed for anxiety   • aspirin 81 mg chewable tablet Chew 81 mg daily   • Cholecalciferol 50 MCG (2000 UT) CAPS 1 capsule   • cyclobenzaprine (FLEXERIL) 5 mg tablet    • estrogens, conjugated (Premarin) vaginal cream Insert 1 g into the vagina daily   • meloxicam (MOBIC) 7.5 mg tablet    • Multiple Vitamins-Minerals (ONE-A-DAY WOMENS 50 PLUS PO) Take by mouth daily   • omega-3-acid ethyl esters (LOVAZA) 1 g capsule TAKE 2 CAPSULES BY MOUTH TWO TIMES DAILY   • Xiidra 5 % op solution INSTILL 1 DROP INTO BOTH EYES TWICE A DAY   • [DISCONTINUED] Turmeric 500 MG CAPS every 24 hours   • triamcinolone (KENALOG) 0.1 % cream APPLY TOPICALLY TWO TIMES DAILY (Patient not taking: Reported on 12/5/2023)   • [DISCONTINUED] methylPREDNISolone 4 MG tablet therapy pack As directed   • [DISCONTINUED] predniSONE 10 mg tablet TAKE 6 TABLETS BY MOUTH DAILY FOR 2 DAYS, THEN 5 TABLETS DAILY FOR 2 DAYS, THEN 4 TABLETS DAILY FOR 2 DAYS, THEN 3 TABLETS DAILY FOR 2 DAYS,       Objective     /84 (BP Location: Left arm, Patient Position: Sitting, Cuff Size: Standard)   Pulse 82   Temp 98.7 °F (37.1 °C) (Tympanic)   Resp 16   Ht 5' 2" (1.575 m)   Wt 67.2 kg (148 lb 3.2 oz)   SpO2 97%   BMI 27.11 kg/m²     Physical Exam  Vitals and nursing note reviewed. Constitutional:       Appearance: Normal appearance. HENT:      Head: Normocephalic and atraumatic. Right Ear: There is impacted cerumen. Left Ear: Tympanic membrane normal.      Mouth/Throat:      Mouth: Mucous membranes are moist.   Eyes:      Pupils: Pupils are equal, round, and reactive to light. Cardiovascular:      Rate and Rhythm: Normal rate and regular rhythm. Pulses: Normal pulses. Heart sounds: Normal heart sounds. Pulmonary:      Effort: Pulmonary effort is normal.      Breath sounds: Normal breath sounds. Abdominal:      Palpations: Abdomen is soft. Skin:     General: Skin is warm. Capillary Refill: Capillary refill takes less than 2 seconds. Neurological:      Mental Status: She is alert and oriented to person, place, and time. Psychiatric:         Mood and Affect: Mood normal.         Behavior: Behavior normal.       Joey Carson MD  BMI Counseling: Body mass index is 27.11 kg/m². The BMI is above normal. Nutrition recommendations include reducing portion sizes, decreasing overall calorie intake, and 3-5 servings of fruits/vegetables daily. Exercise recommendations include moderate aerobic physical activity for 150 minutes/week.

## 2023-12-05 NOTE — ASSESSMENT & PLAN NOTE
Cholesterol 210, . Discussed importance of low fat diet and regular exercise. Will continue to monitor.

## 2023-12-22 ENCOUNTER — TELEPHONE (OUTPATIENT)
Dept: FAMILY MEDICINE CLINIC | Facility: CLINIC | Age: 63
End: 2023-12-22

## 2023-12-22 DIAGNOSIS — R05.8 OTHER COUGH: Primary | ICD-10-CM

## 2023-12-22 RX ORDER — PREDNISONE 50 MG/1
50 TABLET ORAL DAILY
Qty: 5 TABLET | Refills: 0 | Status: SHIPPED | OUTPATIENT
Start: 2023-12-22 | End: 2023-12-27

## 2023-12-22 RX ORDER — BENZONATATE 200 MG/1
200 CAPSULE ORAL 3 TIMES DAILY PRN
Qty: 20 CAPSULE | Refills: 0 | Status: SHIPPED | OUTPATIENT
Start: 2023-12-22

## 2023-12-22 NOTE — TELEPHONE ENCOUNTER
Patient tested positive for covid yesterday. Said she only needs something for the cough and is asking that something be called in

## 2023-12-22 NOTE — TELEPHONE ENCOUNTER
Called patient to inform her scripts were sent and asked if they can be sent to [facility]

## 2023-12-22 NOTE — TELEPHONE ENCOUNTER
Patient is asking that the two scripts just sent can be sent to [facility] on [facility]

## 2023-12-26 ENCOUNTER — TELEPHONE (OUTPATIENT)
Dept: FAMILY MEDICINE CLINIC | Facility: CLINIC | Age: 63
End: 2023-12-26

## 2023-12-26 NOTE — TELEPHONE ENCOUNTER
Pt left message as she was positive for COVID on 12/21/2023. Pt needs to be excuse to return back tomorrow.  I sent excuse as requested

## 2023-12-26 NOTE — LETTER
December 26, 2023    Patient: Jennifer Melissa  YOB: 1960  Date of Last Encounter: 12/21/2023      To whom it may concern:     Jennifer Melissa has tested positive for COVID-19 (Coronavirus). Please excuse her from 12/21/23 to 12/26/2023. She may return to work on 12/27/2023.    Sincerely,         Margarita Nicholson MD

## 2024-04-15 ENCOUNTER — APPOINTMENT (OUTPATIENT)
Dept: LAB | Facility: MEDICAL CENTER | Age: 64
End: 2024-04-15
Payer: COMMERCIAL

## 2024-04-15 DIAGNOSIS — E78.00 HIGH CHOLESTEROL: ICD-10-CM

## 2024-04-15 LAB
ALBUMIN SERPL BCP-MCNC: 4.1 G/DL (ref 3.5–5)
ALP SERPL-CCNC: 40 U/L (ref 34–104)
ALT SERPL W P-5'-P-CCNC: 12 U/L (ref 7–52)
ANION GAP SERPL CALCULATED.3IONS-SCNC: 9 MMOL/L (ref 4–13)
AST SERPL W P-5'-P-CCNC: 26 U/L (ref 13–39)
BASOPHILS # BLD AUTO: 0.03 THOUSANDS/ÂΜL (ref 0–0.1)
BASOPHILS NFR BLD AUTO: 1 % (ref 0–1)
BILIRUB SERPL-MCNC: 0.44 MG/DL (ref 0.2–1)
BUN SERPL-MCNC: 19 MG/DL (ref 5–25)
CALCIUM SERPL-MCNC: 8.7 MG/DL (ref 8.4–10.2)
CHLORIDE SERPL-SCNC: 100 MMOL/L (ref 96–108)
CHOLEST SERPL-MCNC: 190 MG/DL
CO2 SERPL-SCNC: 28 MMOL/L (ref 21–32)
CREAT SERPL-MCNC: 0.54 MG/DL (ref 0.6–1.3)
EOSINOPHIL # BLD AUTO: 0.17 THOUSAND/ÂΜL (ref 0–0.61)
EOSINOPHIL NFR BLD AUTO: 3 % (ref 0–6)
ERYTHROCYTE [DISTWIDTH] IN BLOOD BY AUTOMATED COUNT: 12.7 % (ref 11.6–15.1)
GFR SERPL CREATININE-BSD FRML MDRD: 100 ML/MIN/1.73SQ M
GLUCOSE P FAST SERPL-MCNC: 97 MG/DL (ref 65–99)
HCT VFR BLD AUTO: 41.6 % (ref 34.8–46.1)
HDLC SERPL-MCNC: 67 MG/DL
HGB BLD-MCNC: 13.5 G/DL (ref 11.5–15.4)
IMM GRANULOCYTES # BLD AUTO: 0.03 THOUSAND/UL (ref 0–0.2)
IMM GRANULOCYTES NFR BLD AUTO: 1 % (ref 0–2)
LDLC SERPL CALC-MCNC: 106 MG/DL (ref 0–100)
LYMPHOCYTES # BLD AUTO: 1.89 THOUSANDS/ÂΜL (ref 0.6–4.47)
LYMPHOCYTES NFR BLD AUTO: 29 % (ref 14–44)
MCH RBC QN AUTO: 31.4 PG (ref 26.8–34.3)
MCHC RBC AUTO-ENTMCNC: 32.5 G/DL (ref 31.4–37.4)
MCV RBC AUTO: 97 FL (ref 82–98)
MONOCYTES # BLD AUTO: 0.58 THOUSAND/ÂΜL (ref 0.17–1.22)
MONOCYTES NFR BLD AUTO: 9 % (ref 4–12)
NEUTROPHILS # BLD AUTO: 3.81 THOUSANDS/ÂΜL (ref 1.85–7.62)
NEUTS SEG NFR BLD AUTO: 57 % (ref 43–75)
NRBC BLD AUTO-RTO: 0 /100 WBCS
PLATELET # BLD AUTO: 326 THOUSANDS/UL (ref 149–390)
PMV BLD AUTO: 9.6 FL (ref 8.9–12.7)
POTASSIUM SERPL-SCNC: 4.3 MMOL/L (ref 3.5–5.3)
PROT SERPL-MCNC: 6.4 G/DL (ref 6.4–8.4)
RBC # BLD AUTO: 4.3 MILLION/UL (ref 3.81–5.12)
SODIUM SERPL-SCNC: 137 MMOL/L (ref 135–147)
TRIGL SERPL-MCNC: 85 MG/DL
TSH SERPL DL<=0.05 MIU/L-ACNC: 1.27 UIU/ML (ref 0.45–4.5)
WBC # BLD AUTO: 6.51 THOUSAND/UL (ref 4.31–10.16)

## 2024-04-15 PROCEDURE — 85025 COMPLETE CBC W/AUTO DIFF WBC: CPT

## 2024-04-15 PROCEDURE — 80061 LIPID PANEL: CPT

## 2024-04-15 PROCEDURE — 80053 COMPREHEN METABOLIC PANEL: CPT

## 2024-04-15 PROCEDURE — 84443 ASSAY THYROID STIM HORMONE: CPT

## 2024-04-15 PROCEDURE — 36415 COLL VENOUS BLD VENIPUNCTURE: CPT

## 2024-05-10 ENCOUNTER — TELEPHONE (OUTPATIENT)
Dept: FAMILY MEDICINE CLINIC | Facility: CLINIC | Age: 64
End: 2024-05-10

## 2024-05-10 NOTE — TELEPHONE ENCOUNTER
----- Message from Margarita Nicholson MD sent at 5/9/2024 10:33 PM EDT -----  Blood work came back showing slightly elevated LDL otherwise blood work came back normal.

## 2024-05-10 NOTE — TELEPHONE ENCOUNTER
Patient called to review lab results, warm tsf to [facility] nurse triage to assist further.

## 2024-07-10 ENCOUNTER — OFFICE VISIT (OUTPATIENT)
Dept: FAMILY MEDICINE CLINIC | Facility: CLINIC | Age: 64
End: 2024-07-10
Payer: COMMERCIAL

## 2024-07-10 VITALS
HEART RATE: 86 BPM | DIASTOLIC BLOOD PRESSURE: 86 MMHG | SYSTOLIC BLOOD PRESSURE: 130 MMHG | BODY MASS INDEX: 27.46 KG/M2 | RESPIRATION RATE: 16 BRPM | WEIGHT: 149.2 LBS | OXYGEN SATURATION: 95 % | TEMPERATURE: 96.7 F | HEIGHT: 62 IN

## 2024-07-10 DIAGNOSIS — F41.9 ANXIETY: Primary | ICD-10-CM

## 2024-07-10 DIAGNOSIS — E78.00 HIGH CHOLESTEROL: ICD-10-CM

## 2024-07-10 DIAGNOSIS — G47.00 INSOMNIA, UNSPECIFIED TYPE: ICD-10-CM

## 2024-07-10 DIAGNOSIS — L30.9 DERMATITIS: ICD-10-CM

## 2024-07-10 PROCEDURE — 99214 OFFICE O/P EST MOD 30 MIN: CPT | Performed by: NURSE PRACTITIONER

## 2024-07-10 RX ORDER — ALPRAZOLAM 0.5 MG/1
0.5 TABLET ORAL
Qty: 30 TABLET | Refills: 0 | Status: SHIPPED | OUTPATIENT
Start: 2024-07-10

## 2024-07-10 RX ORDER — ASCORBATE CALCIUM 500 MG
500 TABLET ORAL DAILY
COMMUNITY

## 2024-07-10 RX ORDER — OMEGA-3-ACID ETHYL ESTERS 1 G/1
2 CAPSULE, LIQUID FILLED ORAL 2 TIMES DAILY
Qty: 120 CAPSULE | Refills: 3 | Status: SHIPPED | OUTPATIENT
Start: 2024-07-10

## 2024-07-10 RX ORDER — CLOTRIMAZOLE AND BETAMETHASONE DIPROPIONATE 10; .64 MG/G; MG/G
CREAM TOPICAL 2 TIMES DAILY
Qty: 45 G | Refills: 0 | Status: SHIPPED | OUTPATIENT
Start: 2024-07-10

## 2024-07-10 NOTE — ASSESSMENT & PLAN NOTE
- Not well controlled.  - Prescription sent for Xanax 0.5 mg daily as needed. Can take before bedtime to help her sleep. Discussed side effects.  - Will continue to monitor.

## 2024-07-10 NOTE — PROGRESS NOTES
Ambulatory Visit  Name: Jennifer Melissa      : 1960      MRN: [id number]  Encounter Provider: JEREMIAH Nation  Encounter Date: 7/10/2024   Encounter department: [facility]    Assessment & Plan   1. Anxiety  Assessment & Plan:  - Not well controlled.  - Prescription sent for Xanax 0.5 mg daily as needed. Can take before bedtime to help her sleep. Discussed side effects.  - Will continue to monitor.   Orders:  -     ALPRAZolam (XANAX) 0.5 mg tablet; Take 1 tablet (0.5 mg total) by mouth daily at bedtime as needed for anxiety  2. Insomnia, unspecified type  Assessment & Plan:  - Not well controlled.  - Did not like trazodone due to side effects.  - Can take Xanax before bedtime to help sleep. Discussed side effects.  - Will continue to monitor.  3. Dermatitis  Assessment & Plan:  - Prescription sent for Lotrisone.   Orders:  -     clotrimazole-betamethasone (LOTRISONE) 1-0.05 % cream; Apply topically 2 (two) times a day  4. High cholesterol  -     omega-3-acid ethyl esters (LOVAZA) 1 g capsule; Take 2 capsules (2 g total) by mouth 2 (two) times a day       History of Present Illness     Patient presents to office today with complaints of increasing anxiety and difficulty sleeping. Has tried trazodone with no improvement. States she has had a lot of stress at work and with her daughter. Feels very withdrawn. Has a lot of anxiety at night when she is trying to sleep that keeps her awake because her mind is racing. Has tried xanax in the past which helped. Also complains of rash in gluteal cleft that she gets in the summer time. She denies any other concerns or complaints today.         Review of Systems   Constitutional:  Negative for fatigue and fever.   HENT:  Negative for trouble swallowing.    Eyes:  Negative for visual disturbance.   Respiratory:  Negative for cough and shortness of breath.    Cardiovascular:  Negative for chest pain and palpitations.   Gastrointestinal:   Negative for abdominal pain and blood in stool.   Endocrine: Negative for cold intolerance and heat intolerance.   Genitourinary:  Negative for difficulty urinating and dysuria.   Musculoskeletal:  Negative for gait problem.   Skin:  Positive for rash.   Neurological:  Negative for dizziness, syncope and headaches.   Hematological:  Negative for adenopathy.   Psychiatric/Behavioral:  Positive for sleep disturbance. Negative for behavioral problems. The patient is nervous/anxious.      Past Medical History:   Diagnosis Date   • Back pain      Past Surgical History:   Procedure Laterality Date   • CARPAL TUNNEL RELEASE     • HYSTERECTOMY     • PLANTAR FASCIECTOMY       Family History   Problem Relation Age of Onset   • Colon cancer Mother    • Cervical cancer Sister    • Heart attack Brother      Social History     Tobacco Use   • Smoking status: Never   • Smokeless tobacco: Never   Vaping Use   • Vaping status: Never Used   Substance and Sexual Activity   • Alcohol use: Yes     Alcohol/week: 1.0 - 2.0 standard drink of alcohol     Types: 1 - 2 Glasses of wine per week   • Drug use: Not on file   • Sexual activity: Not on file     Current Outpatient Medications on File Prior to Visit   Medication Sig   • aspirin 81 mg chewable tablet Chew 81 mg daily   • Calcium Ascorbate (VITAMIN C) 500 mg tablet Take 500 mg by mouth daily   • cyclobenzaprine (FLEXERIL) 5 mg tablet    • estrogens, conjugated (Premarin) vaginal cream Insert 1 g into the vagina daily   • meloxicam (MOBIC) 7.5 mg tablet    • Multiple Vitamins-Minerals (ONE-A-DAY WOMENS 50 PLUS PO) Take by mouth daily   • [DISCONTINUED] omega-3-acid ethyl esters (LOVAZA) 1 g capsule TAKE 2 CAPSULES BY MOUTH TWO TIMES DAILY   • benzonatate (TESSALON) 200 MG capsule Take 1 capsule (200 mg total) by mouth 3 (three) times a day as needed for cough (Patient not taking: Reported on 7/10/2024)   • Cholecalciferol 50 MCG (2000 UT) CAPS 1 capsule (Patient not taking: Reported on  "7/10/2024)   • traZODone (DESYREL) 50 mg tablet Take 1 tablet (50 mg total) by mouth daily at bedtime (Patient not taking: Reported on 7/10/2024)   • triamcinolone (KENALOG) 0.1 % cream APPLY TOPICALLY TWO TIMES DAILY (Patient not taking: Reported on 12/5/2023)   • Xiidra 5 % op solution INSTILL 1 DROP INTO BOTH EYES TWICE A DAY (Patient not taking: Reported on 7/10/2024)   • [DISCONTINUED] ALPRAZolam (XANAX) 0.5 mg tablet Take 1 tablet (0.5 mg total) by mouth daily at bedtime as needed for anxiety (Patient not taking: Reported on 7/10/2024)     Allergies   Allergen Reactions   • Gabapentin Anaphylaxis     Immunization History   Administered Date(s) Administered   • COVID-19 MODERNA VACC 0.5 ML IM 03/15/2021, 04/12/2021, 11/23/2021   • INFLUENZA 09/26/2013, 09/27/2019, 08/25/2020, 11/09/2021, 11/07/2022, 11/12/2023   • Influenza Quadrivalent Preservative Free 3 years and older IM 08/25/2020   • Tdap 10/31/2019   • influenza, injectable, quadrivalent 08/24/2020     Objective     /86 (BP Location: Left arm, Patient Position: Sitting, Cuff Size: Large)   Pulse 86   Temp (!) 96.7 °F (35.9 °C) (Tympanic)   Resp 16   Ht 5' 2\" (1.575 m)   Wt 67.7 kg (149 lb 3.2 oz)   SpO2 95%   BMI 27.29 kg/m²     Physical Exam  Vitals and nursing note reviewed.   Constitutional:       Appearance: Normal appearance. She is well-developed.   HENT:      Head: Normocephalic and atraumatic.      Right Ear: External ear normal.      Left Ear: External ear normal.   Eyes:      Conjunctiva/sclera: Conjunctivae normal.   Cardiovascular:      Rate and Rhythm: Normal rate and regular rhythm.      Heart sounds: Normal heart sounds.   Pulmonary:      Effort: Pulmonary effort is normal.      Breath sounds: Normal breath sounds.   Musculoskeletal:         General: Normal range of motion.      Cervical back: Normal range of motion.   Skin:     General: Skin is warm and dry.   Neurological:      Mental Status: She is alert and oriented to " person, place, and time.   Psychiatric:         Mood and Affect: Mood normal.         Behavior: Behavior normal.

## 2024-07-10 NOTE — ASSESSMENT & PLAN NOTE
- Not well controlled.  - Did not like trazodone due to side effects.  - Can take Xanax before bedtime to help sleep. Discussed side effects.  - Will continue to monitor.

## 2024-08-13 ENCOUNTER — OFFICE VISIT (OUTPATIENT)
Dept: FAMILY MEDICINE CLINIC | Facility: CLINIC | Age: 64
End: 2024-08-13
Payer: COMMERCIAL

## 2024-08-13 ENCOUNTER — HOSPITAL ENCOUNTER (OUTPATIENT)
Dept: RADIOLOGY | Facility: IMAGING CENTER | Age: 64
Discharge: HOME | End: 2024-08-13
Payer: COMMERCIAL

## 2024-08-13 VITALS
DIASTOLIC BLOOD PRESSURE: 88 MMHG | WEIGHT: 153.6 LBS | OXYGEN SATURATION: 97 % | TEMPERATURE: 98.4 F | SYSTOLIC BLOOD PRESSURE: 138 MMHG | BODY MASS INDEX: 28.26 KG/M2 | RESPIRATION RATE: 16 BRPM | HEIGHT: 62 IN | HEART RATE: 77 BPM

## 2024-08-13 DIAGNOSIS — F32.89 OTHER DEPRESSION: ICD-10-CM

## 2024-08-13 DIAGNOSIS — G47.00 INSOMNIA, UNSPECIFIED TYPE: ICD-10-CM

## 2024-08-13 DIAGNOSIS — E78.00 HIGH CHOLESTEROL: Primary | ICD-10-CM

## 2024-08-13 DIAGNOSIS — F41.9 ANXIETY: ICD-10-CM

## 2024-08-13 DIAGNOSIS — M81.0 AGE-RELATED OSTEOPOROSIS WITHOUT CURRENT PATHOLOGICAL FRACTURE: ICD-10-CM

## 2024-08-13 PROCEDURE — 99214 OFFICE O/P EST MOD 30 MIN: CPT | Performed by: FAMILY MEDICINE

## 2024-08-13 PROCEDURE — 77080 DXA BONE DENSITY AXIAL: CPT

## 2024-08-13 RX ORDER — NORTRIPTYLINE HCL 10 MG
10 CAPSULE ORAL
Qty: 90 CAPSULE | Refills: 1 | Status: SHIPPED | OUTPATIENT
Start: 2024-08-13

## 2024-08-13 NOTE — PROGRESS NOTES
Ambulatory Visit  Name: Jennifer Melissa      : 1960      MRN: [id number]  Encounter Provider: Margarita Nicholson MD  Encounter Date: 2024   Encounter department: [facility]    Assessment & Plan   1. High cholesterol  Assessment & Plan:  Slightly elevated LDL but improved from before.  Discussed importance of low fat diet and regular exercise. Will continue to monitor.  Orders:  -     CBC and differential; Future  -     Comprehensive metabolic panel; Future  -     Lipid Panel with Direct LDL reflex; Future  -     TSH, 3rd generation with Free T4 reflex; Future  2. Anxiety  Assessment & Plan:  Was given prescription for Zoloft.  Continue on Xanax as needed.  Discussed with possible side effect.  We will continue to monitor.  Orders:  -     sertraline (Zoloft) 50 mg tablet; Take 1 tablet (50 mg total) by mouth daily  3. Insomnia, unspecified type  Assessment & Plan:  Not well-controlled.  I am going to start her on nortriptyline 10 mg at bedtime.  Discussed with possible side effects.  Orders:  -     nortriptyline (PAMELOR) 10 mg capsule; Take 1 capsule (10 mg total) by mouth daily at bedtime  4. Other depression  Assessment & Plan:  Was given prescription for Zoloft 50 mg daily.  It was discussed about possible side effects.  Orders:  -     sertraline (Zoloft) 50 mg tablet; Take 1 tablet (50 mg total) by mouth daily         History of Present Illness     She is here today for follow-up multiple medical problems.  She has been taking her medications.  She denies any side effects from her medications.  She has been having more problems with anxiety and depression.  She has been having problems sleeping.  Denies any suicidal homicidal thoughts.  Her last blood work was from April showed slightly improved LDL.      Review of Systems   Constitutional:  Negative for chills and fever.   HENT:  Negative for trouble swallowing.    Eyes:  Negative for visual disturbance.   Respiratory:  Negative  for cough and shortness of breath.    Cardiovascular:  Negative for chest pain, palpitations and leg swelling.   Gastrointestinal:  Negative for abdominal pain, constipation and diarrhea.   Endocrine: Negative for cold intolerance and heat intolerance.   Genitourinary:  Negative for difficulty urinating and dysuria.   Musculoskeletal:  Negative for gait problem.   Skin:  Negative for rash.   Neurological:  Negative for dizziness, tremors, seizures and headaches.   Hematological:  Negative for adenopathy.   Psychiatric/Behavioral:  Positive for dysphoric mood and sleep disturbance. Negative for behavioral problems, self-injury and suicidal ideas. The patient is nervous/anxious.      Past Medical History:   Diagnosis Date   • Back pain      Past Surgical History:   Procedure Laterality Date   • CARPAL TUNNEL RELEASE     • HYSTERECTOMY     • PLANTAR FASCIECTOMY       Family History   Problem Relation Age of Onset   • Colon cancer Mother    • Cervical cancer Sister    • Heart attack Brother      Social History     Tobacco Use   • Smoking status: Never   • Smokeless tobacco: Never   Vaping Use   • Vaping status: Never Used   Substance and Sexual Activity   • Alcohol use: Yes     Alcohol/week: 1.0 - 2.0 standard drink of alcohol     Types: 1 - 2 Glasses of wine per week   • Drug use: Not on file   • Sexual activity: Not on file     Current Outpatient Medications on File Prior to Visit   Medication Sig   • ALPRAZolam (XANAX) 0.5 mg tablet Take 1 tablet (0.5 mg total) by mouth daily at bedtime as needed for anxiety   • aspirin 81 mg chewable tablet Chew 81 mg daily   • Calcium Ascorbate (VITAMIN C) 500 mg tablet Take 500 mg by mouth daily   • clotrimazole-betamethasone (LOTRISONE) 1-0.05 % cream Apply topically 2 (two) times a day   • cyclobenzaprine (FLEXERIL) 5 mg tablet    • estrogens, conjugated (Premarin) vaginal cream Insert 1 g into the vagina daily   • meloxicam (MOBIC) 7.5 mg tablet    • Multiple Vitamins-Minerals  "(ONE-A-DAY WOMENS 50 PLUS PO) Take by mouth daily   • omega-3-acid ethyl esters (LOVAZA) 1 g capsule Take 2 capsules (2 g total) by mouth 2 (two) times a day   • benzonatate (TESSALON) 200 MG capsule Take 1 capsule (200 mg total) by mouth 3 (three) times a day as needed for cough (Patient not taking: Reported on 7/10/2024)   • Cholecalciferol 50 MCG (2000 UT) CAPS 1 capsule (Patient not taking: Reported on 7/10/2024)   • triamcinolone (KENALOG) 0.1 % cream APPLY TOPICALLY TWO TIMES DAILY (Patient not taking: Reported on 12/5/2023)     Allergies   Allergen Reactions   • Gabapentin Anaphylaxis     Immunization History   Administered Date(s) Administered   • COVID-19 MODERNA VACC 0.5 ML IM 03/15/2021, 04/12/2021, 11/23/2021   • INFLUENZA 09/26/2013, 09/27/2019, 08/25/2020, 11/09/2021, 11/07/2022, 11/12/2023   • Influenza Quadrivalent Preservative Free 3 years and older IM 08/25/2020   • Tdap 10/31/2019   • influenza, injectable, quadrivalent 08/24/2020     Objective     /88 (BP Location: Left arm, Patient Position: Sitting, Cuff Size: Large)   Pulse 77   Temp 98.4 °F (36.9 °C) (Tympanic)   Resp 16   Ht 5' 2\" (1.575 m)   Wt 69.7 kg (153 lb 9.6 oz)   SpO2 97%   BMI 28.09 kg/m²     Physical Exam  Vitals and nursing note reviewed.   Constitutional:       Appearance: She is well-developed.   HENT:      Head: Normocephalic and atraumatic.   Eyes:      Pupils: Pupils are equal, round, and reactive to light.   Cardiovascular:      Rate and Rhythm: Normal rate and regular rhythm.      Heart sounds: Normal heart sounds.   Pulmonary:      Effort: Pulmonary effort is normal.      Breath sounds: Normal breath sounds.   Abdominal:      General: Bowel sounds are normal.      Palpations: Abdomen is soft.   Musculoskeletal:      Cervical back: Normal range of motion and neck supple.   Lymphadenopathy:      Cervical: No cervical adenopathy.   Skin:     General: Skin is warm.   Neurological:      Mental Status: She is alert " and oriented to person, place, and time.

## 2024-08-15 PROBLEM — F32.A DEPRESSION: Status: ACTIVE | Noted: 2024-08-15

## 2024-08-15 NOTE — ASSESSMENT & PLAN NOTE
Was given prescription for Zoloft.  Continue on Xanax as needed.  Discussed with possible side effect.  We will continue to monitor.

## 2024-08-15 NOTE — ASSESSMENT & PLAN NOTE
Not well-controlled.  I am going to start her on nortriptyline 10 mg at bedtime.  Discussed with possible side effects.

## 2024-08-15 NOTE — ASSESSMENT & PLAN NOTE
Slightly elevated LDL but improved from before.  Discussed importance of low fat diet and regular exercise. Will continue to monitor.

## 2024-08-22 ENCOUNTER — TELEPHONE (OUTPATIENT)
Age: 64
End: 2024-08-22

## 2024-08-22 NOTE — TELEPHONE ENCOUNTER
Patient called, states the newly prescribed sertraline (Zoloft) 50 mg tablet [[id number]] is causing difficulty sleeping. Patient advised do not discontinue medication drastically. Patient states she hasn't taken the medication 08/22/2024 as of yet. Patient request a callback with suggestions, allows VM to be left if Patient doesn't answer. Please advise Patient at [phone], if any further questions.

## 2024-08-23 ENCOUNTER — TELEPHONE (OUTPATIENT)
Age: 64
End: 2024-08-23

## 2024-08-23 NOTE — TELEPHONE ENCOUNTER
Patient called in and stated that she stopped taking the Zoloft and was having problems sleeping when taking it.    Patient is requesting a call back to go over this - [phone] and advise. Patient is requesting to be put on something different for her Depression. Please advise.     Thank you

## 2024-12-02 ENCOUNTER — HOSPITAL ENCOUNTER (OUTPATIENT)
Dept: MAMMOGRAPHY | Facility: CLINIC | Age: 64
Discharge: HOME | End: 2024-12-02
Payer: COMMERCIAL

## 2024-12-02 ENCOUNTER — HOSPITAL ENCOUNTER (OUTPATIENT)
Dept: ULTRASOUND IMAGING | Facility: CLINIC | Age: 64
Discharge: HOME | End: 2024-12-02
Payer: COMMERCIAL

## 2024-12-02 VITALS — WEIGHT: 150 LBS | HEIGHT: 62 IN | BODY MASS INDEX: 27.6 KG/M2

## 2024-12-02 DIAGNOSIS — Z12.31 ENCOUNTER FOR SCREENING MAMMOGRAM FOR BREAST CANCER: ICD-10-CM

## 2024-12-02 DIAGNOSIS — R92.30 INCONCLUSIVE MAMMOGRAPHY DUE TO DENSE BREASTS: ICD-10-CM

## 2024-12-02 DIAGNOSIS — R92.2 INCONCLUSIVE MAMMOGRAPHY DUE TO DENSE BREASTS: ICD-10-CM

## 2024-12-02 PROCEDURE — 76641 ULTRASOUND BREAST COMPLETE: CPT

## 2024-12-02 PROCEDURE — 77063 BREAST TOMOSYNTHESIS BI: CPT

## 2024-12-02 PROCEDURE — 77067 SCR MAMMO BI INCL CAD: CPT

## 2025-02-06 DIAGNOSIS — E78.00 HIGH CHOLESTEROL: ICD-10-CM

## 2025-02-06 RX ORDER — OMEGA-3-ACID ETHYL ESTERS 1 G/1
2 CAPSULE, LIQUID FILLED ORAL 2 TIMES DAILY
Qty: 120 CAPSULE | Refills: 3 | OUTPATIENT
Start: 2025-02-06

## 2025-02-16 DIAGNOSIS — E78.00 HIGH CHOLESTEROL: ICD-10-CM

## 2025-02-17 RX ORDER — OMEGA-3-ACID ETHYL ESTERS 1 G/1
2 CAPSULE, LIQUID FILLED ORAL 2 TIMES DAILY
Qty: 120 CAPSULE | Refills: 3 | OUTPATIENT
Start: 2025-02-17

## 2025-02-22 DIAGNOSIS — E78.00 HIGH CHOLESTEROL: ICD-10-CM

## 2025-02-25 NOTE — TELEPHONE ENCOUNTER
Last seen 8/13/24 refill for 1 month only sent to provider for approval. I lm for pt to schedule a visit

## 2025-02-27 RX ORDER — OMEGA-3-ACID ETHYL ESTERS 1 G/1
2 CAPSULE, LIQUID FILLED ORAL 2 TIMES DAILY
Qty: 60 CAPSULE | Refills: 0 | Status: SHIPPED | OUTPATIENT
Start: 2025-02-27

## 2025-03-17 ENCOUNTER — HOSPITAL ENCOUNTER (OUTPATIENT)
Dept: RADIOLOGY | Facility: HOSPITAL | Age: 65
Discharge: HOME | End: 2025-03-17
Payer: COMMERCIAL

## 2025-03-17 DIAGNOSIS — R00.2 PALPITATIONS: ICD-10-CM

## 2025-03-17 PROCEDURE — 71046 X-RAY EXAM CHEST 2 VIEWS: CPT

## 2025-03-19 ENCOUNTER — HOSPITAL ENCOUNTER (OUTPATIENT)
Dept: NON INVASIVE DIAGNOSTICS | Facility: HOSPITAL | Age: 65
Discharge: HOME | End: 2025-03-19
Payer: COMMERCIAL

## 2025-03-19 DIAGNOSIS — R00.2 PALPITATIONS: ICD-10-CM

## 2025-03-19 PROCEDURE — 93225 XTRNL ECG REC<48 HRS REC: CPT

## 2025-03-19 PROCEDURE — 93226 XTRNL ECG REC<48 HR SCAN A/R: CPT

## 2025-03-23 PROCEDURE — 93227 XTRNL ECG REC<48 HR R&I: CPT
